# Patient Record
Sex: FEMALE | Race: WHITE | HISPANIC OR LATINO | Employment: OTHER | URBAN - METROPOLITAN AREA
[De-identification: names, ages, dates, MRNs, and addresses within clinical notes are randomized per-mention and may not be internally consistent; named-entity substitution may affect disease eponyms.]

---
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
## 2021-04-20 DIAGNOSIS — E06.3 HYPOTHYROIDISM DUE TO HASHIMOTO'S THYROIDITIS: ICD-10-CM

## 2021-04-20 RX ORDER — LEVOTHYROXINE SODIUM 100 UG/1
100 TABLET ORAL DAILY
Qty: 30 TABLET | Refills: 0 | Status: SHIPPED | OUTPATIENT
Start: 2021-04-20 | End: 2021-04-26

## 2021-04-26 ENCOUNTER — OFFICE VISIT (OUTPATIENT)
Dept: PEDIATRICS | Facility: CLINIC | Age: 21
End: 2021-04-26
Attending: PEDIATRICS
Payer: COMMERCIAL

## 2021-04-26 ENCOUNTER — HOSPITAL ENCOUNTER (OUTPATIENT)
Dept: LAB | Facility: CLINIC | Age: 21
End: 2021-04-26
Attending: PEDIATRICS
Payer: COMMERCIAL

## 2021-04-26 VITALS — WEIGHT: 158.07 LBS | HEIGHT: 64 IN | BODY MASS INDEX: 26.99 KG/M2

## 2021-04-26 DIAGNOSIS — E66.3 OVERWEIGHT: ICD-10-CM

## 2021-04-26 DIAGNOSIS — E06.3 HYPOTHYROIDISM DUE TO HASHIMOTO'S THYROIDITIS: Primary | ICD-10-CM

## 2021-04-26 DIAGNOSIS — E06.3 HYPOTHYROIDISM DUE TO HASHIMOTO'S THYROIDITIS: ICD-10-CM

## 2021-04-26 LAB
T4 FREE SERPL-MCNC: 1.17 NG/DL (ref 0.76–1.46)
TSH SERPL DL<=0.005 MIU/L-ACNC: 4.62 MU/L (ref 0.4–4)

## 2021-04-26 PROCEDURE — 84443 ASSAY THYROID STIM HORMONE: CPT | Performed by: PEDIATRICS

## 2021-04-26 PROCEDURE — 99214 OFFICE O/P EST MOD 30 MIN: CPT | Performed by: PEDIATRICS

## 2021-04-26 PROCEDURE — 36415 COLL VENOUS BLD VENIPUNCTURE: CPT | Performed by: PEDIATRICS

## 2021-04-26 PROCEDURE — G0463 HOSPITAL OUTPT CLINIC VISIT: HCPCS

## 2021-04-26 PROCEDURE — 84439 ASSAY OF FREE THYROXINE: CPT | Performed by: PEDIATRICS

## 2021-04-26 RX ORDER — LEVOTHYROXINE SODIUM 112 UG/1
112 TABLET ORAL DAILY
Qty: 90 TABLET | Refills: 3 | Status: SHIPPED | OUTPATIENT
Start: 2021-04-26 | End: 2021-11-12 | Stop reason: DRUGHIGH

## 2021-04-26 RX ORDER — CETIRIZINE HYDROCHLORIDE 5 MG/1
5 TABLET ORAL DAILY
COMMUNITY

## 2021-04-26 RX ORDER — LEVOTHYROXINE SODIUM 100 UG/1
100 TABLET ORAL DAILY
Qty: 90 TABLET | Refills: 3 | Status: SHIPPED | OUTPATIENT
Start: 2021-04-26 | End: 2021-04-26 | Stop reason: DRUGHIGH

## 2021-04-26 ASSESSMENT — PAIN SCALES - GENERAL: PAINLEVEL: NO PAIN (0)

## 2021-04-26 ASSESSMENT — PATIENT HEALTH QUESTIONNAIRE - PHQ9: SUM OF ALL RESPONSES TO PHQ QUESTIONS 1-9: 5

## 2021-04-26 ASSESSMENT — MIFFLIN-ST. JEOR: SCORE: 1479.13

## 2021-04-26 NOTE — NURSING NOTE
"Informant-    Halie is accompanied by self    Reason for Visit-  Hasimotos    Vitals signs-  Ht 1.629 m (5' 4.13\")   Wt 71.7 kg (158 lb 1.1 oz)   BMI 27.02 kg/m      There are concerns about the child's exposure to violence in the home: No    Face to Face time: 5 minutes  Jeannie Chamorro MA        "

## 2021-04-26 NOTE — PATIENT INSTRUCTIONS
1- Please increase the dose of levothyroxine from 100 to 112 mcg orally daily. I placed a new prescription in today (90-day supply ar your request to [facility]).  2- You will need recheck thyroid labs in 1 month.   3- I am referring you to a registered dietitian.   4- Follow up with me in a year.    For questions, please call [phone]

## 2021-04-26 NOTE — PROGRESS NOTES
Pediatric Endocrinology Follow-Up Consultation      Patient: Halie Vigil MRN# [id number]   YOB: 2000 Age: 20 year old   Date of Visit: Apr 26, 2021      Dear Dr. Saskia Bennett:    I had the pleasure of seeing your patient, Halie Vigil in the Pediatric Endocrinology Clinic, [facility], on Apr 26, 2021 for a follow up consultation regarding hypothyroidism due to Hashimoto's thyroiditis.           Problem list:     Patient Active Problem List    Diagnosis Date Noted     Hypothyroidism due to Hashimoto's thyroiditis 11/02/2018     Priority: Medium     Hashimoto's thyroiditis 11/02/2018     Priority: Medium     Abnormal finding on thyroid function test 10/29/2018     Priority: Medium            HPI:   Initial history was obtained from patient, patient's mother and paper chart from her PCPs office.  As you well know, Halie Vigil is a 20 year old female with hypothyroidism in the context of Hashimoto's thyroiditis, and food/seasonal/environmental allergies whom I had the pleasure of evaluating for the first time on 10/30/2018 when she presented with her mother as a referral from her PCP's office in consultation for hypothyroidism in the context of auto-immune thyroid disease.    She was being evaluated by her PCP for painful periods. On 8/20/2018, her TSH was 12.52 mU/mL (reference range 0.4-4.5), with a free T4 of 1.05 ng/dL (reference range 0.9-1.6). On 9/4/2018, her TSH was 15.58 mU/mL (reference range 0.4-4.5), with a free T4 of 1 ng/dL, and positive thyroid peroxidase (TPO) and anti-thyroglobulin antibodies (369 international unit(s)/mL and 13.3 international unit(s)/mL, respectively). She was started on levothyroxine 50 mcg orally daily at that point. On 10/20/2018, her TSH was 2.95 mU/mL, with a free T4 of 1.68 ng/dL (reference range 0.9-1.6), so her PCP spoke to someone from endocrine and her levothyroxine dose was reduced from 50 to 44 mcg orally  daily (half the 88 mcg pills). Her repeat labs on 11/19/2018 showed normal TSH and free T4 levels.    Of note, Halie was on Thyrostim by [employer] (a homeopathic supplement) during the time frame from Sept- Oct 2018. It was discontinued a week before her labs that were drawn on 10/20/2018.  She was screened for celiac disease on 1/26/2018, by her PCP and it was negative.      Interim History:  Halie is not accompanied to today's visit by anyone.  Since her last visit on 5/7/2020, she had been doing well although she states that she and her entire family got COVID-19 in October 2020.     Halie has a normal level of energy, and a normal appetite. She had a 15 Ib weight gain over the last 2 years. She states that this is unexpected as she has continued to work out during the pandemic. She has normal bowel movements. Halie denies having palpitations, tremor, sleep disturbance, heat or cold intolerance or skin/hair changes.  Halie is currently on 100 mcg of levothyroxine orally daily, and has been taking it regularly every morning at 8 AM, 1 hour before eating, the same way every day. There have not been issues with remembering her medication.    Halie is currently on 100 mcg of levothyroxine orally daily (this dose was last changed on 5/7/2020), and has been taking it regularly every morning at 8 AM, the same way every day (1 hour before eating). There have not been issues with remembering to take her medication.    Her last set of thyroid labs was on 7/29/2020 which showed an elevated TSH of 3.6 uIU/mL (ref range 0.4-4) and a free T4 of 1.45 ng/dL (ref range 0.93-1.6).     Halie is generally healthy, does not get sick frequently, and when she does, she recovers quickly. She denies having polyuria or polydipsia.   She had menarche at age 12 years.  Her periods occur monthly and last 5-7 days. Her LMP was 4/21/2021. She has been on OCP's which were started by her PCP.    I have reviewed the available past  laboratory evaluations, imaging studies, and medical records available to me at this virtual visit. I have reviewed Halie's growth chart.            Social History:     Social History     Social History Narrative    5/5/2019: Halie lives with her mother and brother in [place], [place]. 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 Her parents and  and is alive and well.     She has a number of allergies to foods (anaphylaxis with nuts, mouth itching with fruits and uncooked veggies).        5/7/2020: Halie lives with her mother in [place], [place]. She's a senior in [employer] and is currently doing distant learning due to the COVID-19 pandemic. She has a number of food allergies.          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  Lives at home with her family. She works 5 days per week at [employer] and at [employer].          Family History:   Father is  5 feet 11 inches tall.  Mother is  5 feet 4.5 inches tall.     Midparental Height is 5 feet 5.25 inches.      History of:  Adrenal insufficiency: none.  Autoimmune disease: none (no SLE, Crohn's, ulcerative colitis, celiac disease, .  Calcium problems: none.  Delayed puberty: none.  Diabetes mellitus: maternal great grandparents, and paternal great grandfather (T2D).  Early puberty: none.  Genetic disease: none.  Short stature: none.  Tall stature: none.  Thyroid disease: maternal second cousin (male), the father of whom has thyroid disease and is unrelated to Halie's mother.  Seasonal allergies: father and brother   MI: her father had a heart attack at about 56 years of age.  SVT: mother (status post ablation in Dec 2019)    Reviewed. The entire immediate family got COVID-19 in October 2020. They recovered. Her brother (21 years) had an appendectomy.          Allergies:     Allergies   Allergen Reactions     Amoxicillin      Penicillins              Medications:     Current Outpatient Medications  "  Medication Sig Dispense Refill     cetirizine (ZYRTEC) 5 MG tablet Take 5 mg by mouth daily       levothyroxine (SYNTHROID/LEVOTHROID) 112 MCG tablet Take 1 tablet (112 mcg) by mouth daily 90 tablet 3     norgestim-eth estrad triphasic (ORTHO TRI-CYCLEN, 28,) 0.18/0.215/0.25 MG-35 MCG tablet Take 1 tablet by mouth daily                Review of Systems:   Gen: Negative.  Eye: Negative.  ENT: she states that her sense of smell has not returned after she got COVID.  Pulmonary:  Negative.  Cardio: Negative.  Gastrointestinal: Negative.   Hematologic: Negative.  Genitourinary: Negative.  Musculoskeletal: Negative.  Psychiatric: Anxiety was high during the school year, but since the stay-at-home order and distant learning that started due to the COVID-19 pandemic she has had significantly less anxiety. She is seeing a therapist every other week.   Neurologic: she reports loss of olfaction since she got COVID-19.  Skin: hirsutism (hair on the upper lip, chin and below the umbilicus which she shaves). Her mother previously stated that she has the same issue but continues to have normal periods and attributes this to her [place] heritage. Of note, she was started on OCP's about 2 years ago.     Endocrine: see HPI.            Physical Exam:   Height 1.629 m (5' 4.13\"), weight 71.7 kg (158 lb 1.1 oz), not currently breastfeeding.  Blood pressure percentiles are not available for patients who are 18 years or older.  Height: 5' 4.134\", 48 %ile (Z= -0.06) based on CDC (Girls, 2-20 Years) Stature-for-age data based on Stature recorded on 4/26/2021.  Weight: 158 lbs 1.12 oz, 87 %ile (Z= 1.11) based on CDC (Girls, 2-20 Years) weight-for-age data using vitals from 4/26/2021.  BMI: Body mass index is 27.02 kg/m . 88 %ile (Z= 1.16) based on CDC (Girls, 2-20 Years) BMI-for-age based on BMI available as of 4/26/2021.      Constitutional: awake, alert, cooperative, no apparent distress  Eyes:   Lids and lashes normal, sclera clear, " conjunctiva normal. Pupils are equal, round and reactive to light.  ENT:    Normocephalic, without obvious abnormality, external ears without lesions, oral pharynx with moist mucus membranes  Neck:   Supple, symmetrical, trachea midline, thyroid symmetric, palpable, not enlarged and no tenderness  Hematologic / Lymphatic:       no cervical lymphadenopathy  Lungs: No increased work of breathing, clear to auscultation bilaterally with good air entry.  Cardiovascular:           Regular rate and rhythm, no murmurs.  Abdomen:        No scars, normal bowel sounds, soft, non-distended, non-tender, no masses palpated, no hepatosplenomegaly  Breasts: deferred  Pubic hair: deferred  Musculoskeletal: There is no redness, warmth, or swelling of the joints.  Full range of motion noted.  Motor strength and tone are normal.  Neurologic:      no hand tremor. Awake, alert, oriented to name, place and time.  Patellar deep tendon reflexes are symmetric and 2+.  Neuropsychiatric:        normal  Skin:    She has mild acne (a few small acne lesions on the face). She has shaved residual dark hairs on the upper lip, chin, and male pattern hair growth below the umbilicus. No acanthosis nigricans.        Laboratory results:     Component      Latest Ref Rng & Units 8/20/2018 9/4/2018 10/20/2018   TSH     0.4-4.5 mcU/mL 12.52 15.58 2.950- PCP changed levothyroxine dose from 50 to 44 mcg orally daily   T4 Free     0.9-1.6 ng/dL 1.05 1 1.68     At her PCP's office on 9/4/2018:    international unit(s)/mL (reference range 0-26)  Anti-thyroglobulin antibodies 13.3 international units/mL ( reference range 0-0.9)     Component      Latest Ref Rng & Units 4/4/2019- on 44 mcg of levothyroxine 10/24/2019- on 50 mcg levothyroxine 1/24/2020- on 62.5 mcg levothyroxine 4/29/2020- on 75 mcg of levothyroxine   TSH      0.40 - 4.00 mU/L 6.47 (H) 5.56 (H) 4.95 (H) 12.26 (H)   T4 Free      0.76 - 1.46 ng/dL 0.96 1.31 1.06 1     Component      Latest Ref  Rng & Units 4/26/2021- on 100 mcg of levothyroxine daily   T4 Free      0.76 - 1.46 ng/dL 1.17   TSH      0.40 - 4.00 mU/L 4.62 (H)            Assessment and Plan:   1- Hashimoto's thyroiditis   2- Auto-immune hypothyroidism (due to Hashimoto's thyroiditis)  3- Rapid weight gain    Halie is a 20-year-old female with hypothyroidism in the context of Hashimoto's thyroiditis. She was started on levothyroxine in Sept 2018. Her most recent thyroid function tests today 4/26/2021 indicated that her dose -assuming good and regular intake- is inadequate. I recommend increasing her levothyroxine dose from 100 to 112 mcg orally taken daily, and rechecking her labs in 1-2 months.      Discussed symptoms of hypothyroidism, what Hashimoto's thyroiditis is, its diagnosis and natural history.     Finally, Halie has gained 15 Ib since 2019. This is despite working out at the gym daily even through the COVID-19 pandemic. I advised that she meet with the registered dietitian.    Plan:     Orders Placed This Encounter   Procedures     TSH     T4 free       Patient Instructions   1- Please increase the dose of levothyroxine from 100 to 112 mcg orally daily. I placed a new prescription in today (90-day supply ar your request to [facility]).  2- You will need recheck thyroid labs in 1 month.   3- I am referring you to a registered dietitian.   4- Follow up with me in a year.    For questions, please call [phone]  The plan had been discussed in detail with Halie and her mother who are in agreement.  Thank you for allowing me the opportunity to participate in Halie's care.  Please do not hesitate to call with questions or concerns.    Review of the result(s) of each unique test - TSH, free T4  30 minutes spent on the date of the encounter doing chart review, history and exam, documentation and further activities per the note      Sincerely,    Brianna Montalvo, MS  , Pediatric Endocrinology  [facility]   [facility]   Tel. [phone]  Fax [phone]    CC  VENICE ORTEGA    Copy to patient  DIANA, J CARLOS TAO  [address] [facility] [id number]

## 2021-04-27 ENCOUNTER — TELEPHONE (OUTPATIENT)
Dept: PEDIATRICS | Facility: CLINIC | Age: 21
End: 2021-04-27

## 2021-04-27 DIAGNOSIS — E06.3 HYPOTHYROIDISM DUE TO HASHIMOTO'S THYROIDITIS: Primary | ICD-10-CM

## 2021-04-27 NOTE — TELEPHONE ENCOUNTER
----- Message from Chelo Antony MD sent at 4/26/2021  5:15 PM CDT -----  Hi Ladies,    Can you please call the patient herself and let her know that her TSH is mildly elevated which suggests that her dose of levothyroxine may not be enough.    1- Please increase the dose of levothyroxine from 100 to 112 mcg orally daily. I placed a new prescription in today (90-day supply ar your request to [facility]).  2- You will need recheck thyroid labs in 1 month.     Please let the pharmacy know that I cancelled the previous prescription that I put in today for the 100 mcg dose.    Thank you so much.    Chelo

## 2021-04-27 NOTE — CONFIDENTIAL NOTE
Spoke to Halie regarding update and recommendation from Dr Antony, verbalized understanding.   Will place future thyroid orders for lab drawn in one month.    Saskia Araujo RN on 4/27/2021 at 2:44 PM

## 2021-05-25 ENCOUNTER — VIRTUAL VISIT (OUTPATIENT)
Dept: PEDIATRICS | Facility: CLINIC | Age: 21
End: 2021-05-25
Attending: PEDIATRICS
Payer: COMMERCIAL

## 2021-05-25 DIAGNOSIS — E66.3 OVERWEIGHT: Primary | ICD-10-CM

## 2021-05-25 PROCEDURE — 97802 MEDICAL NUTRITION INDIV IN: CPT | Mod: GT | Performed by: DIETITIAN, REGISTERED

## 2021-05-25 NOTE — PROGRESS NOTES
CLINICAL NUTRITION SERVICES - PEDIATRIC VIDEO VISIT NOTE    Halie is a 20 year old who is being evaluated via a billable video visit.      If the video visit is dropped, the invitation should be resent by: Send to e-mail at: [email or web address]  Will anyone else be joining your video visit? No    Video Start Time: 10:44 AM    Video-Visit Details    Type of service:  Video Visit    Video End Time:11:16 AM    Originating Location (pt. Location): Home    Distant Location (provider location):  [facility] [place]     Platform used for Video Visit: [facility]     CLINICAL NUTRITION SERVICES - PEDIATRIC ASSESSMENT NOTE    REASON FOR ASSESSMENT  Halie Vigil is a 20 year old female seen by the dietitian via video visit for education on healthy eating.     ANTHROPOMETRICS  Height/Length : 162.9 cm, 47.58%tile (Z-score: -0.06)  Weight: 71.7 kg, 86.64%tile (Z-score: 1.11)  BMI: 27.02 kg/m^2, 87.6%tile (Z-score: 1.16)  Dosing Weight: 71.7 kg  Comments: Height stable at the 48%tile.  Weight gain of 9.6 kg (21 lb) from July 2019 to present.  Increased from the 72%tile to the 86%tile.      NUTRITION HISTORY & CURRENT NUTRITIONAL INTAKES  Halie is on a reguar diet at home.   Typical food/fluid intake is:  -breakfast: banana + Que bar (before going to the gym)  -lunch: 2 eggs (fried or hardboiled) + toast + fruit + yogurt (Chobani)/cheese  -dinner: chicken/shrimp + vegetable (peppers) + potatoes (small portion) or pasta + veggie meatballs or at work at [employer] has an [employer] breakfast sandwich (vegan sandwich containing 420 Kcal, 22 gm fat)  -beverages: water, Ripple milk (Original) with lunch and dinner, cranberry digestive health juice box, occasional drinks from [employer] (black tea + lemonade, Refresher drink = 90 Kcal, latte with sugar-free vanilla and oat milk)  Halie states she does track her intake sometimes and typically tries to stay around 1800 Kcal on days she does not  work out and 2000 Kcal on days she does work out.   Halie is very active and goes to the gym 5-6 days per week.  She typically bikes, runs, strength trains and does power lifting and ab workouts.  Halie states she has been doing a lot more strength training over the winter and now that she is not in track and field (graduated high school), she has not been running as much.  However, is starting to increase her cardio now that it's nicer outside. She states that even with the weight gain she has had, her clothes still fit and she doesn't see drastic differences in her body.    Information obtained from Patient  Factors affecting nutrition intake include: none    CURRENT NUTRITION SUPPORT  No current nutrition support    PHYSICAL FINDINGS  Observed  Appears fit   Obtained from Chart/Interdisciplinary Team  History of hypothyroidism due to Hashimoto's thyroiditis    LABS Reviewed    MEDICATIONS Reviewed    ASSESSED NUTRITION NEEDS  BMR (5247) x 1.2-1.4 = 1712-5102 Kcal/d  Estimated Energy Needs: 26-30 kcal/kg  Estimated Protein Needs: 0.8 g/kg  Estimated Fluid Needs: 2535 mL (maintenance) or per MD  Micronutrient Needs: RDA for age    NUTRITION STATUS VALIDATION  Patient does not meet criteria for malnutrition at this time.    NUTRITION DIAGNOSIS  Food and nutrition-related knowledge deficit related to above average weight gain over the past 1.5 years as evidenced by no previous formal education on healthy eating.    INTERVENTIONS  Nutrition Prescription  Make healthy choices to meet 100% of assessed nutrition needs and maintain appropriate BMI.     Nutrition Education  Provided education on healthy eating and calorie goals.  Discussed that overall Halie appears to be eating a healthy, balanced diet and does not appear to be overeating.  Discussed that weight gain may be largely due to the amount of strength training Halie has been doing with less cardio work outs.  Especially given Halie's clothes still fit her  the same and she does not feel like she gained a significant amount of weight.  Discussed strategies for healthy eating and weight management such as emphasis on fruits and vegetables as at least half of meals with 1/4 being starch/grain and 1/4 meat/protein.  Also suggested being mindful of calorie containing beverages she is consuming and potentially choosing a lower calorie (and lower fat) option for dinner when she is working.  Suggested increasing cardio workouts as well to see if this helps with weight maintenance or slight loss.  Halie receptive to information discussed.    Implementation  1. Provided education on healthy eating.  See above for details.  2. Provided with RD contact information and encouraged follow-up as needed.    Goals   1. Meet 100% of assessed nutrition needs.   2. Weight maintenance or slight loss to maintain/decrease BMI.     FOLLOW UP/MONITORING  Will continue to monitor progress towards goals and provide nutrition education as needed.    Cara Dixon RD, LD   Pediatric Dietitian   Email: [email or web address]   Phone: [phone]   Fax #: [phone]

## 2021-05-25 NOTE — NURSING NOTE
Halie is a 20 year old who is being evaluated via a billable video visit.      How would you like to obtain your AVS? Mail a copy  If the video visit is dropped, the invitation should be resent by: Send to e-mail at: [email or web address]  Will anyone else be joining your video visit? No      Video Start Time:   Video-Visit Details    Type of service:  Video Visit    Video End Time:    Originating Location (pt. Location): Home    Distant Location (provider location):  [facility] [place]     Platform used for Video Visit: Ren Araujo RN on 5/25/2021 at 10:35 AM

## 2021-08-07 LAB
T4 FREE SERPL-MCNC: 1.38 NG/DL
TSH SERPL-ACNC: 5.6 MCU/ML

## 2021-11-08 ENCOUNTER — LAB (OUTPATIENT)
Dept: LAB | Facility: CLINIC | Age: 21
End: 2021-11-08
Payer: COMMERCIAL

## 2021-11-08 DIAGNOSIS — E06.3 HYPOTHYROIDISM DUE TO HASHIMOTO'S THYROIDITIS: ICD-10-CM

## 2021-11-08 LAB
T4 FREE SERPL-MCNC: 1.08 NG/DL (ref 0.76–1.46)
TSH SERPL DL<=0.005 MIU/L-ACNC: 4.33 MU/L (ref 0.4–4)

## 2021-11-08 PROCEDURE — 36415 COLL VENOUS BLD VENIPUNCTURE: CPT

## 2021-11-08 PROCEDURE — 84443 ASSAY THYROID STIM HORMONE: CPT

## 2021-11-08 PROCEDURE — 84439 ASSAY OF FREE THYROXINE: CPT

## 2021-11-10 ENCOUNTER — DOCUMENTATION ONLY (OUTPATIENT)
Dept: OTHER | Facility: CLINIC | Age: 21
End: 2021-11-10
Payer: COMMERCIAL

## 2021-11-12 ENCOUNTER — TELEPHONE (OUTPATIENT)
Dept: PEDIATRICS | Facility: CLINIC | Age: 21
End: 2021-11-12
Payer: COMMERCIAL

## 2021-11-12 DIAGNOSIS — E06.3 HYPOTHYROIDISM DUE TO HASHIMOTO'S THYROIDITIS: Primary | ICD-10-CM

## 2021-11-12 RX ORDER — LEVOTHYROXINE SODIUM 125 UG/1
125 TABLET ORAL DAILY
Qty: 30 TABLET | Refills: 3 | Status: SHIPPED | OUTPATIENT
Start: 2021-11-12 | End: 2022-03-23

## 2021-11-15 NOTE — TELEPHONE ENCOUNTER
Mom called back and was given note from Dr. Antony. She will plan on having labs done again in about 4-6 weeks.

## 2021-11-15 NOTE — TELEPHONE ENCOUNTER
Called and left message for family to call back for results from Dr. Antony.        Can you please let her know that the TSH is mildly elevated with a normal free T4.     Component                                    Latest Ref Rng & Units                   11/8/2021               T4 Free                                      0.76 - 1.46 ng/dL                        1.08                     TSH                                          0.40 - 4.00 mU/L                         4.33 (H)                     I recommend increasing her levothyroxine dose from 112 mcg daily to 125 mcg daily and repeating labs in 6 weeks.

## 2021-12-22 DIAGNOSIS — N92.6 IRREGULAR PERIODS: Primary | ICD-10-CM

## 2021-12-30 ENCOUNTER — LAB (OUTPATIENT)
Dept: LAB | Facility: CLINIC | Age: 21
End: 2021-12-30
Payer: COMMERCIAL

## 2021-12-30 DIAGNOSIS — N92.6 IRREGULAR PERIODS: ICD-10-CM

## 2021-12-30 DIAGNOSIS — E06.3 HYPOTHYROIDISM DUE TO HASHIMOTO'S THYROIDITIS: ICD-10-CM

## 2021-12-30 LAB
ESTRADIOL SERPL-MCNC: 61 PG/ML
FSH SERPL-ACNC: 6.2 IU/L
LH SERPL-ACNC: 11 IU/L
T4 FREE SERPL-MCNC: 1.19 NG/DL (ref 0.76–1.46)
TSH SERPL DL<=0.005 MIU/L-ACNC: 1.25 MU/L (ref 0.4–4)

## 2021-12-30 PROCEDURE — 82670 ASSAY OF TOTAL ESTRADIOL: CPT

## 2021-12-30 PROCEDURE — 83002 ASSAY OF GONADOTROPIN (LH): CPT

## 2021-12-30 PROCEDURE — 36415 COLL VENOUS BLD VENIPUNCTURE: CPT

## 2021-12-30 PROCEDURE — 84439 ASSAY OF FREE THYROXINE: CPT

## 2021-12-30 PROCEDURE — 84443 ASSAY THYROID STIM HORMONE: CPT

## 2021-12-30 PROCEDURE — 83001 ASSAY OF GONADOTROPIN (FSH): CPT

## 2022-01-06 ENCOUNTER — TELEPHONE (OUTPATIENT)
Dept: PEDIATRICS | Facility: CLINIC | Age: 22
End: 2022-01-06
Payer: COMMERCIAL

## 2022-01-06 NOTE — TELEPHONE ENCOUNTER
Called and left mom message with results info on letter from Dr. Antony. Normal labs, continue on same dose of levothyroxine and follow up in 1 year w/ labs.

## 2022-03-23 DIAGNOSIS — E06.3 HYPOTHYROIDISM DUE TO HASHIMOTO'S THYROIDITIS: ICD-10-CM

## 2022-03-23 RX ORDER — LEVOTHYROXINE SODIUM 125 UG/1
125 TABLET ORAL DAILY
Qty: 30 TABLET | Refills: 3 | Status: SHIPPED | OUTPATIENT
Start: 2022-03-23 | End: 2022-07-17

## 2022-03-23 NOTE — TELEPHONE ENCOUNTER
Refill request received from: [facility]  Medication Requested: Levothyroxine 125 mcg  Directions: One tablet by mouth daily  Quantity: 30 with 3 month supply  Last Office Visit: 4/26/21  Next Appointment Scheduled for: none, per note f/u in 1 year with labs  Last refill: 11/12/21  Sent To:  RN or Provider       Mom states that patient will be going out of town and has 2 pills left. Mom also requesting to have more refills on hand.    Will route to provider for review.    Anne Gutiérrez on 3/23/2022 at 8:29 AM

## 2022-05-21 ENCOUNTER — HOSPITAL ENCOUNTER (EMERGENCY)
Facility: CLINIC | Age: 22
Discharge: HOME | End: 2022-05-21
Attending: EMERGENCY MEDICINE | Admitting: EMERGENCY MEDICINE
Payer: COMMERCIAL

## 2022-05-21 VITALS
WEIGHT: 158 LBS | BODY MASS INDEX: 27.01 KG/M2 | HEART RATE: 84 BPM | RESPIRATION RATE: 18 BRPM | DIASTOLIC BLOOD PRESSURE: 81 MMHG | SYSTOLIC BLOOD PRESSURE: 133 MMHG | OXYGEN SATURATION: 99 % | TEMPERATURE: 97.6 F

## 2022-05-21 DIAGNOSIS — L03.115 CELLULITIS OF RIGHT FOOT: ICD-10-CM

## 2022-05-21 PROCEDURE — 250N000013 HC RX MED GY IP 250 OP 250 PS 637: Performed by: EMERGENCY MEDICINE

## 2022-05-21 PROCEDURE — 99283 EMERGENCY DEPT VISIT LOW MDM: CPT

## 2022-05-21 RX ORDER — CEPHALEXIN 500 MG/1
500 CAPSULE ORAL ONCE
Status: COMPLETED | OUTPATIENT
Start: 2022-05-21 | End: 2022-05-21

## 2022-05-21 RX ORDER — CEPHALEXIN 500 MG/1
500 CAPSULE ORAL 3 TIMES DAILY
Qty: 20 CAPSULE | Refills: 0 | Status: SHIPPED | OUTPATIENT
Start: 2022-05-21 | End: 2022-05-28

## 2022-05-21 RX ADMIN — CEPHALEXIN 500 MG: 500 CAPSULE ORAL at 23:08

## 2022-05-21 ASSESSMENT — ENCOUNTER SYMPTOMS
FEVER: 0
COLOR CHANGE: 1
MUSCULOSKELETAL NEGATIVE: 1
WOUND: 1

## 2022-05-21 NOTE — LETTER
May 21, 2022      To Whom It May Concern:      Halie Vigil was seen in our Emergency Department today, 05/21/22.  I expect her condition to improve over the next 1-2 days.  She may return to work/school when improved.    Sincerely,        Lisandra RN

## 2022-05-22 NOTE — ED PROVIDER NOTES
History     Chief Complaint:    Foot Pain and Wound Check      HPI   Hlaie Vigil is a 21 year old female who presents with acute spreading redness R foot.  Earlier today, seen by podiatry for planar wart, with topical treatment performed.  No fever; no hx diabetes or MRSA.     Review of Systems   Constitutional: Negative for fever.   Musculoskeletal: Negative.    Skin: Positive for color change and wound.   All other systems reviewed and are negative.    Allergies:  Amoxicillin  Pcn [Penicillins]      Medications:    cephALEXin (KEFLEX) 500 MG capsule  cetirizine (ZYRTEC) 5 MG tablet  levothyroxine (SYNTHROID/LEVOTHROID) 125 MCG tablet  norgestim-eth estrad triphasic (ORTHO TRI-CYCLEN, 28,) 0.18/0.215/0.25 MG-35 MCG tablet        Past Medical History:    No past medical history on file.  Patient Active Problem List    Diagnosis Date Noted     Hypothyroidism due to Hashimoto's thyroiditis 11/02/2018     Priority: Medium     Hashimoto's thyroiditis 11/02/2018     Priority: Medium     Abnormal finding on thyroid function test 10/29/2018     Priority: Medium      Social History:  Saskia Bennett  The patient presents to the ED with mother    Physical Exam     Patient Vitals for the past 24 hrs:   BP Temp Temp src Pulse Resp SpO2 Weight   05/21/22 2243 (!) 141/90 97.6  F (36.4  C) Temporal 71 18 99 % 71.7 kg (158 lb)       Physical Exam  General: Patient is alert and cooperative.  Eyes: EOMI. Normal conjunctiva.  Neck:  Normal range of motion and appearance.   Cardiovascular:  Normal rate.   Pulmonary/Chest:  Effort normal.    Musculoskeletal: R foot notable for plantar wart plantar aspect first MTP. See skin exam below.   Neurological: oriented, normal strength, sensation, and coordination.   Skin: erythematous changes surrounding plantar wart, with streak extending to dorsal aspect of foot/anterior ankle.  Psychiatric: Normal mood and affect. Normal behavior and judgement.                Emergency Department  Course   Emergency Department Course:    Assessments:   I obtained history and examined the patient as noted above.    I rechecked the patient and explained findings.     Interventions:  Medications   cephALEXin (KEFLEX) capsule 500 mg (500 mg Oral Given 5/21/22 2308)       Disposition:  The patient was discharged to home.     Impression & Plan    Medical Decision Making:  Afebrile 21 year old female with acute right foot cellulitis, related to recent plantar wart treatment.  Pic noted above.  Treated with keflex, plan 1 wk course, wound check this week if not resolving, return if worse.     Diagnosis:    ICD-10-CM    1. Cellulitis of right foot  L03.115        Discharge Medications:  New Prescriptions    CEPHALEXIN (KEFLEX) 500 MG CAPSULE    Take 1 capsule (500 mg) by mouth 3 times daily for 7 days       .      Camron Alcala MD  05/22/22 6530

## 2022-06-14 DIAGNOSIS — E06.3 HYPOTHYROIDISM DUE TO HASHIMOTO'S THYROIDITIS: Primary | ICD-10-CM

## 2022-07-11 ENCOUNTER — LAB (OUTPATIENT)
Dept: LAB | Facility: CLINIC | Age: 22
End: 2022-07-11
Payer: COMMERCIAL

## 2022-07-11 DIAGNOSIS — E06.3 HYPOTHYROIDISM DUE TO HASHIMOTO'S THYROIDITIS: ICD-10-CM

## 2022-07-11 LAB
T4 FREE SERPL-MCNC: 1.26 NG/DL (ref 0.76–1.46)
TSH SERPL DL<=0.005 MIU/L-ACNC: 1.34 MU/L (ref 0.4–4)

## 2022-07-11 PROCEDURE — 84443 ASSAY THYROID STIM HORMONE: CPT

## 2022-07-11 PROCEDURE — 84439 ASSAY OF FREE THYROXINE: CPT

## 2022-07-11 PROCEDURE — 36415 COLL VENOUS BLD VENIPUNCTURE: CPT

## 2022-07-14 DIAGNOSIS — E06.3 HYPOTHYROIDISM DUE TO HASHIMOTO'S THYROIDITIS: ICD-10-CM

## 2022-07-14 NOTE — TELEPHONE ENCOUNTER
Mom called to request med refill.  Patient leaves for college next week.    Refill request received from: Mom  Pharmacy: [facility]  Medication Requested: Levothyroxine 125mcg  Directions: Take 1 tablet (125 mcg) by mouth daily  Quantity: 30  Last Office Visit: 4/26/21  Next Appointment Scheduled for: 10/6/22  Last refill: 6/22/22  Sent To:  RN or Provider      Routed to provider to sign.  Anne Gutiérrez RN on 7/14/2022 at 1:14 PM

## 2022-07-17 RX ORDER — LEVOTHYROXINE SODIUM 125 UG/1
125 TABLET ORAL DAILY
Qty: 30 TABLET | Refills: 3 | Status: SHIPPED | OUTPATIENT
Start: 2022-07-17 | End: 2022-11-06

## 2022-08-17 DIAGNOSIS — N92.6 IRREGULAR PERIODS: Primary | ICD-10-CM

## 2022-08-17 NOTE — PROGRESS NOTES
Patient's mother called in to inquire about having labs drawn for patient. Patient was seen by SYD Dai recently and provider advised patient to have labs drawn as patient continues to have irregular periods.   She is not on any OCPs.   Orders placed. Patient will be home on Monday for lab draw.     Saskia Araujo RN on 8/17/2022 at 2:50 PM

## 2022-08-22 ENCOUNTER — LAB (OUTPATIENT)
Dept: LAB | Facility: CLINIC | Age: 22
End: 2022-08-22
Payer: COMMERCIAL

## 2022-08-22 DIAGNOSIS — N92.6 IRREGULAR PERIODS: ICD-10-CM

## 2022-08-22 LAB
ESTRADIOL SERPL-MCNC: 39 PG/ML
FSH SERPL IRP2-ACNC: 6.5 MIU/ML
LH SERPL-ACNC: 12.6 MIU/ML

## 2022-08-22 PROCEDURE — 83002 ASSAY OF GONADOTROPIN (LH): CPT

## 2022-08-22 PROCEDURE — 82670 ASSAY OF TOTAL ESTRADIOL: CPT

## 2022-08-22 PROCEDURE — 83001 ASSAY OF GONADOTROPIN (FSH): CPT

## 2022-08-22 PROCEDURE — 36415 COLL VENOUS BLD VENIPUNCTURE: CPT

## 2022-11-20 ENCOUNTER — HEALTH MAINTENANCE LETTER (OUTPATIENT)
Age: 22
End: 2022-11-20

## 2022-12-09 ENCOUNTER — VIRTUAL VISIT (OUTPATIENT)
Dept: PEDIATRICS | Facility: CLINIC | Age: 22
End: 2022-12-09
Attending: PEDIATRICS
Payer: COMMERCIAL

## 2022-12-09 VITALS — HEIGHT: 65 IN | WEIGHT: 155 LBS | BODY MASS INDEX: 25.83 KG/M2

## 2022-12-09 DIAGNOSIS — Z79.899 ENCOUNTER FOR LONG-TERM CURRENT USE OF MEDICATION: ICD-10-CM

## 2022-12-09 DIAGNOSIS — E06.3 HYPOTHYROIDISM DUE TO HASHIMOTO'S THYROIDITIS: Primary | ICD-10-CM

## 2022-12-09 DIAGNOSIS — E66.3 OVERWEIGHT: ICD-10-CM

## 2022-12-09 DIAGNOSIS — N92.6 IRREGULAR PERIODS: ICD-10-CM

## 2022-12-09 PROCEDURE — G0463 HOSPITAL OUTPT CLINIC VISIT: HCPCS | Mod: PN,GT | Performed by: PEDIATRICS

## 2022-12-09 PROCEDURE — 99213 OFFICE O/P EST LOW 20 MIN: CPT | Mod: GT | Performed by: PEDIATRICS

## 2022-12-09 ASSESSMENT — PAIN SCALES - GENERAL: PAINLEVEL: NO PAIN (0)

## 2022-12-09 NOTE — NURSING NOTE
Halie Vigil  is being evaluated via a billable video visit.      How would you like to obtain your AVS? [employer]  For the video visit, send the invitation by: Text to cell phone: [phone]  Will anyone else be joining your video visit? No

## 2022-12-09 NOTE — PROGRESS NOTES
Video-Visit Details    Type of service:  Video Visit    Video Start Time (time video started): 1410    Video End Time (time video stopped): 1432    Originating Location (pt. Location): Home        Distant Location (provider location):  On-site    Mode of Communication:  Video Conference via [facility]    Physician has received verbal consent for a Video Visit from the patient? Yes    Pediatric Endocrinology Follow-Up Consultation    Patient: Halie Vigil MRN# [id number]   YOB: 2000 Age: 22 year old    Date of Visit: Dec 9, 2022    Dear Dr. Saskia Bennett:    I had the pleasure of seeing your patient, Halie Vigil in the Pediatric Endocrinology Clinic, [facility], on Dec 9, 2022 for a follow up consultation regarding hypothyroidism due to Hashimoto's thyroiditis.           Problem list:     Patient Active Problem List    Diagnosis Date Noted     Hypothyroidism due to Hashimoto's thyroiditis 11/02/2018     Priority: Medium     Hashimoto's thyroiditis 11/02/2018     Priority: Medium     Abnormal finding on thyroid function test 10/29/2018     Priority: Medium          HPI:   Initial history was obtained from patient, and electronic medical record.    Clinical summary:    Halie is a now 22 year old female with a past medical history significant for hypothyroidism in due to Hashimoto's thyroiditis, and food/seasonal/environmental allergies whom Dr. Antony first began to evaluate on 10/30/2018 for hypothyroidism in the context of auto-immune thyroid disease.    She was being evaluated by her PCP for painful periods. On 8/20/2018, her TSH was 12.52 mU/mL (reference range 0.4-4.5), with a free T4 of 1.05 ng/dL (reference range 0.9-1.6). On 9/4/2018, her TSH was 15.58 mU/mL (reference range 0.4-4.5), with a free T4 of 1 ng/dL, and positive thyroid peroxidase (TPO) and anti-thyroglobulin antibodies (369 international unit(s)/mL and 13.3 international unit(s)/mL,  respectively). She was started on levothyroxine 50 mcg orally daily at that point. On 10/20/2018, her TSH was 2.95 mU/mL, with a free T4 of 1.68 ng/dL (reference range 0.9-1.6), so her PCP spoke to someone from endocrine and her levothyroxine dose was reduced from 50 to 44 mcg orally daily (half the 88 mcg pills). Her repeat labs on 11/19/2018 showed normal TSH and free T4 levels.    Of note, Halie was on Thyrostim by [employer] (a homeopathic supplement) during the time frame from Sept- Oct 2018. It was discontinued a week before her labs that were drawn on 10/20/2018. She was screened for celiac disease on 1/26/2018, by her PCP and it was negative.    Interim History (12/9/2022):    Since her last visit on 4/2/26/2021, she had been doing well overall. Halie had some questions regarding previous lab results completed in the summer of 2022 as well as for recommendations for adult providers in the [place], [place] area.     Halie has been on Levothyroxine, 125 mcg PO q day. Compliance with her medication was noted to be good, with no missed doses. She actually had an ED visit where she took by mistake an extra dose of the Levothyoxine and made her have symptoms of thyroid hormone excess.     Halie has a normal level of energy, and a normal appetite. She reports that her weight has remained in the 150-155 pound range with no fluctuations. She has normal bowel movements. Halie denies having palpitations, tremor, sleep disturbance, heat or cold intolerance or skin/hair changes.    Her last set of thyroid labs was on 7/11/2022 which showed an elevated TSH of 1.34 uIU/mL (ref range 0.4-4) and a free T4 of 1.26 ng/dL (ref range 0.93-1.6).     Halie is generally healthy, does not get sick frequently, and when she does, she recovers quickly. She denies having polyuria or polydipsia. She had menarche at age 12 years. In the past Halie has been on OCP's (started by her PCP). Her periods occur every 30-40 days and last 5-7 days.  Halie had labs done on 8/22/2022 that showed a normal estradiol, FSH and LH level.     I have reviewed the available past laboratory evaluations, imaging studies, and medical records available to me at this virtual visit. I have reviewed Halie's growth chart.            Social History:     Social History     Social History Narrative    5/5/2019: Halie lives with her mother and brother in [place], [place]. 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 Her parents and  and is alive and well.     She has a number of allergies to foods (anaphylaxis with nuts, mouth itching with fruits and uncooked veggies).        5/7/2020: Halie lives with her mother in [place], [place]. She's a senior in [employer] and is currently doing distant learning due to the COVID-19 pandemic. She has a number of food allergies.          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  Lives at home with her family. She works 5 days per week at [employer] and at [employer].     Halie lives with her  in [facility].          Family History:   Father is  5 feet 11 inches tall.  Mother is  5 feet 4.5 inches tall.     Midparental Height is 5 feet 5.25 inches.    History of:  Adrenal insufficiency: none.  Autoimmune disease: none (no SLE, Crohn's, ulcerative colitis, celiac disease, .  Calcium problems: none.  Delayed puberty: none.  Diabetes mellitus: maternal great grandparents, and paternal great grandfather (T2D).  Early puberty: none.  Genetic disease: none.  Short stature: none.  Tall stature: none.  Thyroid disease: maternal second cousin (male), the father of whom has thyroid disease and is unrelated to Halie's mother.  Seasonal allergies: father and brother   MI: her father had a heart attack at about 56 years of age.  SVT: mother (status post ablation in Dec 2019)         Allergies:     Allergies   Allergen Reactions     Amoxicillin      Pcn [Penicillins]            "Medications:     Current Outpatient Medications   Medication Sig Dispense Refill     cetirizine (ZYRTEC) 5 MG tablet Take 5 mg by mouth daily       levothyroxine (SYNTHROID/LEVOTHROID) 125 MCG tablet Take 1 tablet (125 mcg) by mouth daily 30 tablet 11     norgestim-eth estrad triphasic (ORTHO TRI-CYCLEN) 0.18/0.215/0.25 MG-35 MCG tablet Take 1 tablet by mouth daily (Patient not taking: Reported on 12/9/2022)             Review of Systems:   Gen: Negative.  Eye: Negative.  ENT: Negative.  Pulmonary:  Negative.  Cardio: Negative.  Gastrointestinal: Negative.   Hematologic: Negative.  Genitourinary: Negative.  Musculoskeletal: Negative.  Psychiatric: anxiety. She is seeing a therapist every other week.   Neurologic: she reports loss of olfaction since she got COVID-19.  Skin: hirsutism (hair on the upper lip, chin and below the umbilicus which she shaves), stable.     Endocrine: see HPI.            Physical Exam:   Height 1.651 m (5' 5\"), weight 70.3 kg (155 lb), not currently breastfeeding.  Growth percentile SmartLinks can only be used for patients less than 20 years old.  Height: 5' 5\", Facility age limit for growth percentiles is 20 years.  Weight: 155 lbs 0 oz, Facility age limit for growth percentiles is 20 years.  BMI: Body mass index is 25.79 kg/m . Facility age limit for growth percentiles is 20 years.      Constitutional: awake, alert, cooperative, no apparent distress  Eyes:   Lids and lashes normal, sclera clear, conjunctiva normal. Normal extra ocular movements  ENT:    Normocephalic, without obvious abnormality, external ears without lesions  Neck:   Supple, symmetrical, trachea midline  Hematologic / Lymphatic: no cervical lymphadenopathy  Lungs: Breathing comfortably  Cardiovascular:  Well perfused.  Musculoskeletal: Motor strength and tone are normal.  Neurologic: Awake, alert, oriented to name, place and time.   Neuropsychiatric: normal  Skin: No evidence of rashes        Laboratory results: "     Component      Latest Ref Rng & Units 8/20/2018 9/4/2018 10/20/2018   TSH     0.4-4.5 mcU/mL 12.52 15.58 2.950- PCP changed levothyroxine dose from 50 to 44 mcg orally daily   T4 Free     0.9-1.6 ng/dL 1.05 1 1.68     At her PCP's office on 9/4/2018:    international unit(s)/mL (reference range 0-26)  Anti-thyroglobulin antibodies 13.3 international units/mL ( reference range 0-0.9)     Component      Latest Ref Rng & Units 4/4/2019- on 44 mcg of levothyroxine 10/24/2019- on 50 mcg levothyroxine 1/24/2020- on 62.5 mcg levothyroxine 4/29/2020- on 75 mcg of levothyroxine   TSH      0.40 - 4.00 mU/L 6.47 (H) 5.56 (H) 4.95 (H) 12.26 (H)   T4 Free      0.76 - 1.46 ng/dL 0.96 1.31 1.06 1     Component      Latest Ref Rng & Units 4/26/2021- on 100 mcg of levothyroxine daily   T4 Free      0.76 - 1.46 ng/dL 1.17   TSH      0.40 - 4.00 mU/L 4.62 (H)          Assessment and Plan:   1- Hashimoto's thyroiditis   2- Auto-immune hypothyroidism (due to Hashimoto's thyroiditis)  3- Encounter for long term use of medication    Halie is a now 22 year old female with a past medical history hypothyroidism in the context of Hashimoto's thyroiditis, who was started on levothyroxine in Sept 2018. She appears clinically euthyroid today Her most recent thyroid function tests on 7/11/2022 indicated that her dose -assuming good and regular intake- is inadequate.     Discussed symptoms of hypothyroidism, what Hashimoto's thyroiditis is, its diagnosis and natural history.     Finally, Halie has had a recent history of irregular periods (occurring every 30-40 days on average). Labs obtained by Dr. Antony this summer were all within normal range. She has not exhibited worsening hirsutism or rapid weight gain. I recommended to continue to monitor.    Plan:    - Reviewed previous lab results.  - Continue with current dose of Levothyroxine (verified that refills are available).  - Labs in Jan 2023 (6 months from her last check).  -  Reviewed signs and symptoms of hypo / hyperthyroidism and when to contact our clinic.  - Reviewed previous recommendations by Dr. Antony of providers in the [place] area.  - Requested for Halie to begin to do her search, once she decides on one, she can contact us and we will place the referral to them.      Orders Placed This Encounter   Procedures     TSH     T4 free     The plan had been discussed in detail with Halie who are in agreement. Thank you for allowing me to participate in the care of Halie.  Please do not hesitate to call with questions or concerns.    Sincerely,    Riaz Hernandez MD  Division of Pediatric Endocrinology  [facility]  Phone: [phone]  Fax: [phone]     Total time including review of medical records, history, physical examination, counselling, and coordination of care concerning one or more of the diagnoses listed under the assessment and plan took 25 minutes. I counseled the patient about the nature of the condition(s), options of therapy. All parent questions were answered and parent(s) understood and agreed with the plan.     CC    Patient Care Team:  Saskia Bennett MD as PCP - General (Pediatrics)   Copy to patient  CHER ORTIZ DONALD  [id number] [facility] [id number]

## 2023-06-09 ENCOUNTER — LAB (OUTPATIENT)
Dept: LAB | Facility: CLINIC | Age: 23
End: 2023-06-09
Payer: COMMERCIAL

## 2023-06-09 DIAGNOSIS — Z79.899 ENCOUNTER FOR LONG-TERM CURRENT USE OF MEDICATION: ICD-10-CM

## 2023-06-09 DIAGNOSIS — E06.3 HYPOTHYROIDISM DUE TO HASHIMOTO'S THYROIDITIS: ICD-10-CM

## 2023-06-09 LAB
T4 FREE SERPL-MCNC: 1.3 NG/DL (ref 0.9–1.7)
TSH SERPL DL<=0.005 MIU/L-ACNC: 4.04 UIU/ML (ref 0.3–4.2)

## 2023-06-09 PROCEDURE — 84443 ASSAY THYROID STIM HORMONE: CPT

## 2023-06-09 PROCEDURE — 36415 COLL VENOUS BLD VENIPUNCTURE: CPT

## 2023-06-09 PROCEDURE — 84439 ASSAY OF FREE THYROXINE: CPT

## 2023-06-09 RX ORDER — LEVOTHYROXINE SODIUM 150 UG/1
150 TABLET ORAL DAILY
Qty: 30 TABLET | Refills: 0 | Status: SHIPPED | OUTPATIENT
Start: 2023-06-09 | End: 2023-06-18

## 2023-06-21 ENCOUNTER — LAB REQUISITION (OUTPATIENT)
Dept: LAB | Facility: CLINIC | Age: 23
End: 2023-06-21
Payer: COMMERCIAL

## 2023-06-21 ENCOUNTER — LAB REQUISITION (OUTPATIENT)
Dept: LAB | Facility: CLINIC | Age: 23
End: 2023-06-21

## 2023-06-21 DIAGNOSIS — Z34.81 ENCOUNTER FOR SUPERVISION OF OTHER NORMAL PREGNANCY, FIRST TRIMESTER: ICD-10-CM

## 2023-06-21 DIAGNOSIS — Z12.4 ENCOUNTER FOR SCREENING FOR MALIGNANT NEOPLASM OF CERVIX: ICD-10-CM

## 2023-06-21 DIAGNOSIS — E06.3 AUTOIMMUNE THYROIDITIS: ICD-10-CM

## 2023-06-21 LAB
ABO/RH(D): NORMAL
ANTIBODY SCREEN: NEGATIVE
BASOPHILS # BLD AUTO: 0 10E3/UL (ref 0–0.2)
BASOPHILS NFR BLD AUTO: 1 %
EOSINOPHIL # BLD AUTO: 0 10E3/UL (ref 0–0.7)
EOSINOPHIL NFR BLD AUTO: 1 %
ERYTHROCYTE [DISTWIDTH] IN BLOOD BY AUTOMATED COUNT: 13 % (ref 10–15)
HCT VFR BLD AUTO: 40.3 % (ref 35–47)
HGB BLD-MCNC: 13.1 G/DL (ref 11.7–15.7)
HOLD SPECIMEN: NORMAL
IMM GRANULOCYTES # BLD: 0 10E3/UL
IMM GRANULOCYTES NFR BLD: 1 %
LYMPHOCYTES # BLD AUTO: 1.2 10E3/UL (ref 0.8–5.3)
LYMPHOCYTES NFR BLD AUTO: 18 %
MCH RBC QN AUTO: 28.2 PG (ref 26.5–33)
MCHC RBC AUTO-ENTMCNC: 32.5 G/DL (ref 31.5–36.5)
MCV RBC AUTO: 87 FL (ref 78–100)
MONOCYTES # BLD AUTO: 0.5 10E3/UL (ref 0–1.3)
MONOCYTES NFR BLD AUTO: 8 %
NEUTROPHILS # BLD AUTO: 4.7 10E3/UL (ref 1.6–8.3)
NEUTROPHILS NFR BLD AUTO: 71 %
NRBC # BLD AUTO: 0 10E3/UL
NRBC BLD AUTO-RTO: 0 /100
PLATELET # BLD AUTO: 230 10E3/UL (ref 150–450)
RBC # BLD AUTO: 4.65 10E6/UL (ref 3.8–5.2)
SPECIMEN EXPIRATION DATE: NORMAL
T4 FREE SERPL-MCNC: 1.21 NG/DL (ref 0.9–1.7)
TSH SERPL DL<=0.005 MIU/L-ACNC: 10.7 UIU/ML (ref 0.3–4.2)
WBC # BLD AUTO: 6.6 10E3/UL (ref 4–11)

## 2023-06-21 PROCEDURE — 84443 ASSAY THYROID STIM HORMONE: CPT

## 2023-06-21 PROCEDURE — 87340 HEPATITIS B SURFACE AG IA: CPT | Mod: ORL

## 2023-06-21 PROCEDURE — 86901 BLOOD TYPING SEROLOGIC RH(D): CPT | Mod: ORL

## 2023-06-21 PROCEDURE — 87491 CHLMYD TRACH DNA AMP PROBE: CPT | Mod: ORL

## 2023-06-21 PROCEDURE — 86762 RUBELLA ANTIBODY: CPT | Mod: ORL

## 2023-06-21 PROCEDURE — 87086 URINE CULTURE/COLONY COUNT: CPT | Mod: ORL

## 2023-06-21 PROCEDURE — 84439 ASSAY OF FREE THYROXINE: CPT

## 2023-06-21 PROCEDURE — 86803 HEPATITIS C AB TEST: CPT | Mod: ORL

## 2023-06-21 PROCEDURE — 86850 RBC ANTIBODY SCREEN: CPT | Mod: ORL

## 2023-06-21 PROCEDURE — G0145 SCR C/V CYTO,THINLAYER,RESCR: HCPCS | Mod: ORL

## 2023-06-21 PROCEDURE — 86592 SYPHILIS TEST NON-TREP QUAL: CPT | Mod: ORL

## 2023-06-21 PROCEDURE — 87591 N.GONORRHOEAE DNA AMP PROB: CPT | Mod: ORL

## 2023-06-21 PROCEDURE — 87389 HIV-1 AG W/HIV-1&-2 AB AG IA: CPT | Mod: ORL

## 2023-06-21 PROCEDURE — 85025 COMPLETE CBC W/AUTO DIFF WBC: CPT | Mod: ORL

## 2023-06-22 LAB
C TRACH DNA SPEC QL PROBE+SIG AMP: NEGATIVE
HBV SURFACE AG SERPL QL IA: NONREACTIVE
HCV AB SERPL QL IA: NONREACTIVE
HIV 1+2 AB+HIV1 P24 AG SERPL QL IA: NONREACTIVE
N GONORRHOEA DNA SPEC QL NAA+PROBE: NEGATIVE
RPR SER QL: NONREACTIVE
RUBV IGG SERPL QL IA: 4.17 INDEX
RUBV IGG SERPL QL IA: POSITIVE

## 2023-06-23 LAB
BACTERIA UR CULT: NO GROWTH
BKR LAB AP GYN ADEQUACY: NORMAL
BKR LAB AP GYN INTERPRETATION: NORMAL
BKR LAB AP HPV REFLEX: NORMAL
BKR LAB AP LMP: NORMAL
BKR LAB AP PREVIOUS ABNL DX: NORMAL
BKR LAB AP PREVIOUS ABNORMAL: NORMAL
PATH REPORT.COMMENTS IMP SPEC: NORMAL
PATH REPORT.COMMENTS IMP SPEC: NORMAL
PATH REPORT.RELEVANT HX SPEC: NORMAL

## 2023-07-11 DIAGNOSIS — E06.3 HYPOTHYROIDISM DUE TO HASHIMOTO'S THYROIDITIS: ICD-10-CM

## 2023-07-11 NOTE — TELEPHONE ENCOUNTER
Jaime Kruger,     Can you confirm with Halie about this refill? It was my understanding that her dose had been increased to 200 mcg by her gynecology team. I don't see any subsequent lab results.     Thanks,     Riaz

## 2023-08-02 ENCOUNTER — LAB (OUTPATIENT)
Dept: LAB | Facility: CLINIC | Age: 23
End: 2023-08-02
Payer: COMMERCIAL

## 2023-08-02 DIAGNOSIS — E03.9 HYPOTHYROIDISM: Primary | ICD-10-CM

## 2023-08-02 LAB
T4 FREE SERPL-MCNC: 1.66 NG/DL (ref 0.9–1.7)
TSH SERPL DL<=0.005 MIU/L-ACNC: 3.54 UIU/ML (ref 0.3–4.2)

## 2023-08-02 PROCEDURE — 84439 ASSAY OF FREE THYROXINE: CPT

## 2023-08-02 PROCEDURE — 36415 COLL VENOUS BLD VENIPUNCTURE: CPT

## 2023-08-02 PROCEDURE — 84443 ASSAY THYROID STIM HORMONE: CPT

## 2023-10-09 RX ORDER — LEVOTHYROXINE SODIUM 150 UG/1
TABLET ORAL
Qty: 30 TABLET | Refills: 0 | OUTPATIENT
Start: 2023-10-09

## 2023-11-25 ENCOUNTER — HEALTH MAINTENANCE LETTER (OUTPATIENT)
Age: 23
End: 2023-11-25

## 2024-01-02 ENCOUNTER — LAB REQUISITION (OUTPATIENT)
Dept: LAB | Facility: CLINIC | Age: 24
End: 2024-01-02
Payer: MEDICAID

## 2024-01-02 DIAGNOSIS — Z3A.36 36 WEEKS GESTATION OF PREGNANCY: ICD-10-CM

## 2024-01-02 PROCEDURE — 87653 STREP B DNA AMP PROBE: CPT | Mod: ORL | Performed by: OBSTETRICS & GYNECOLOGY

## 2024-01-03 LAB — GP B STREP DNA SPEC QL NAA+PROBE: NEGATIVE

## 2024-02-07 ENCOUNTER — HOSPITAL ENCOUNTER (INPATIENT)
Facility: CLINIC | Age: 24
LOS: 5 days | Discharge: HOME HEALTH | End: 2024-02-12
Attending: OBSTETRICS & GYNECOLOGY | Admitting: OBSTETRICS & GYNECOLOGY
Payer: MEDICAID

## 2024-02-07 DIAGNOSIS — I10 HYPERTENSION, UNSPECIFIED TYPE: ICD-10-CM

## 2024-02-07 DIAGNOSIS — D62 ANEMIA DUE TO BLOOD LOSS, ACUTE: ICD-10-CM

## 2024-02-07 DIAGNOSIS — Z98.891 S/P CESAREAN SECTION: Primary | ICD-10-CM

## 2024-02-07 LAB
ABO/RH(D): NORMAL
ANTIBODY SCREEN: NEGATIVE
ERYTHROCYTE [DISTWIDTH] IN BLOOD BY AUTOMATED COUNT: 13.8 % (ref 10–15)
HCT VFR BLD AUTO: 38.3 % (ref 35–47)
HGB BLD-MCNC: 12.6 G/DL (ref 11.7–15.7)
MCH RBC QN AUTO: 27.3 PG (ref 26.5–33)
MCHC RBC AUTO-ENTMCNC: 32.9 G/DL (ref 31.5–36.5)
MCV RBC AUTO: 83 FL (ref 78–100)
PLATELET # BLD AUTO: 220 10E3/UL (ref 150–450)
RBC # BLD AUTO: 4.61 10E6/UL (ref 3.8–5.2)
SPECIMEN EXPIRATION DATE: NORMAL
WBC # BLD AUTO: 10.3 10E3/UL (ref 4–11)

## 2024-02-07 PROCEDURE — 86900 BLOOD TYPING SEROLOGIC ABO: CPT | Performed by: OBSTETRICS & GYNECOLOGY

## 2024-02-07 PROCEDURE — 120N000001 HC R&B MED SURG/OB

## 2024-02-07 PROCEDURE — 36415 COLL VENOUS BLD VENIPUNCTURE: CPT | Performed by: OBSTETRICS & GYNECOLOGY

## 2024-02-07 PROCEDURE — 86780 TREPONEMA PALLIDUM: CPT | Performed by: OBSTETRICS & GYNECOLOGY

## 2024-02-07 PROCEDURE — 82040 ASSAY OF SERUM ALBUMIN: CPT | Performed by: OBSTETRICS & GYNECOLOGY

## 2024-02-07 PROCEDURE — 85018 HEMOGLOBIN: CPT | Performed by: OBSTETRICS & GYNECOLOGY

## 2024-02-07 PROCEDURE — 250N000013 HC RX MED GY IP 250 OP 250 PS 637: Performed by: OBSTETRICS & GYNECOLOGY

## 2024-02-07 RX ORDER — LOPERAMIDE HCL 2 MG
4 CAPSULE ORAL
Status: DISCONTINUED | OUTPATIENT
Start: 2024-02-07 | End: 2024-02-09

## 2024-02-07 RX ORDER — ONDANSETRON 4 MG/1
4 TABLET, ORALLY DISINTEGRATING ORAL EVERY 6 HOURS PRN
Status: DISCONTINUED | OUTPATIENT
Start: 2024-02-07 | End: 2024-02-09

## 2024-02-07 RX ORDER — SODIUM CHLORIDE, SODIUM LACTATE, POTASSIUM CHLORIDE, CALCIUM CHLORIDE 600; 310; 30; 20 MG/100ML; MG/100ML; MG/100ML; MG/100ML
INJECTION, SOLUTION INTRAVENOUS CONTINUOUS
Status: DISCONTINUED | OUTPATIENT
Start: 2024-02-07 | End: 2024-02-09

## 2024-02-07 RX ORDER — MISOPROSTOL 200 UG/1
800 TABLET ORAL
Status: DISCONTINUED | OUTPATIENT
Start: 2024-02-07 | End: 2024-02-09

## 2024-02-07 RX ORDER — CITRIC ACID/SODIUM CITRATE 334-500MG
30 SOLUTION, ORAL ORAL
Status: DISCONTINUED | OUTPATIENT
Start: 2024-02-07 | End: 2024-02-09

## 2024-02-07 RX ORDER — PROCHLORPERAZINE MALEATE 10 MG
10 TABLET ORAL EVERY 6 HOURS PRN
Status: DISCONTINUED | OUTPATIENT
Start: 2024-02-07 | End: 2024-02-09

## 2024-02-07 RX ORDER — METOCLOPRAMIDE 10 MG/1
10 TABLET ORAL EVERY 6 HOURS PRN
Status: DISCONTINUED | OUTPATIENT
Start: 2024-02-07 | End: 2024-02-09

## 2024-02-07 RX ORDER — ONDANSETRON 2 MG/ML
4 INJECTION INTRAMUSCULAR; INTRAVENOUS EVERY 6 HOURS PRN
Status: DISCONTINUED | OUTPATIENT
Start: 2024-02-07 | End: 2024-02-09

## 2024-02-07 RX ORDER — NALOXONE HYDROCHLORIDE 0.4 MG/ML
0.4 INJECTION, SOLUTION INTRAMUSCULAR; INTRAVENOUS; SUBCUTANEOUS
Status: DISCONTINUED | OUTPATIENT
Start: 2024-02-07 | End: 2024-02-09

## 2024-02-07 RX ORDER — METOCLOPRAMIDE HYDROCHLORIDE 5 MG/ML
10 INJECTION INTRAMUSCULAR; INTRAVENOUS EVERY 6 HOURS PRN
Status: DISCONTINUED | OUTPATIENT
Start: 2024-02-07 | End: 2024-02-09

## 2024-02-07 RX ORDER — LIDOCAINE 40 MG/G
CREAM TOPICAL
Status: DISCONTINUED | OUTPATIENT
Start: 2024-02-07 | End: 2024-02-09

## 2024-02-07 RX ORDER — CARBOPROST TROMETHAMINE 250 UG/ML
250 INJECTION, SOLUTION INTRAMUSCULAR
Status: DISCONTINUED | OUTPATIENT
Start: 2024-02-07 | End: 2024-02-09

## 2024-02-07 RX ORDER — NALOXONE HYDROCHLORIDE 0.4 MG/ML
0.2 INJECTION, SOLUTION INTRAMUSCULAR; INTRAVENOUS; SUBCUTANEOUS
Status: DISCONTINUED | OUTPATIENT
Start: 2024-02-07 | End: 2024-02-09

## 2024-02-07 RX ORDER — PROCHLORPERAZINE 25 MG
25 SUPPOSITORY, RECTAL RECTAL EVERY 12 HOURS PRN
Status: DISCONTINUED | OUTPATIENT
Start: 2024-02-07 | End: 2024-02-09

## 2024-02-07 RX ORDER — OXYTOCIN 10 [USP'U]/ML
10 INJECTION, SOLUTION INTRAMUSCULAR; INTRAVENOUS
Status: DISCONTINUED | OUTPATIENT
Start: 2024-02-07 | End: 2024-02-09

## 2024-02-07 RX ORDER — KETOROLAC TROMETHAMINE 30 MG/ML
30 INJECTION, SOLUTION INTRAMUSCULAR; INTRAVENOUS
Status: DISCONTINUED | OUTPATIENT
Start: 2024-02-07 | End: 2024-02-09

## 2024-02-07 RX ORDER — LOPERAMIDE HCL 2 MG
2 CAPSULE ORAL
Status: DISCONTINUED | OUTPATIENT
Start: 2024-02-07 | End: 2024-02-09

## 2024-02-07 RX ORDER — OXYTOCIN/0.9 % SODIUM CHLORIDE 30/500 ML
340 PLASTIC BAG, INJECTION (ML) INTRAVENOUS CONTINUOUS PRN
Status: DISCONTINUED | OUTPATIENT
Start: 2024-02-07 | End: 2024-02-09

## 2024-02-07 RX ORDER — METHYLERGONOVINE MALEATE 0.2 MG/ML
200 INJECTION INTRAVENOUS
Status: DISCONTINUED | OUTPATIENT
Start: 2024-02-07 | End: 2024-02-09

## 2024-02-07 RX ORDER — ACETAMINOPHEN 325 MG/1
650 TABLET ORAL EVERY 4 HOURS PRN
Status: DISCONTINUED | OUTPATIENT
Start: 2024-02-07 | End: 2024-02-09

## 2024-02-07 RX ORDER — MISOPROSTOL 100 UG/1
25 TABLET ORAL
Status: DISCONTINUED | OUTPATIENT
Start: 2024-02-07 | End: 2024-02-09

## 2024-02-07 RX ORDER — OXYTOCIN/0.9 % SODIUM CHLORIDE 30/500 ML
100-340 PLASTIC BAG, INJECTION (ML) INTRAVENOUS CONTINUOUS PRN
Status: DISCONTINUED | OUTPATIENT
Start: 2024-02-07 | End: 2024-02-09

## 2024-02-07 RX ORDER — IBUPROFEN 800 MG/1
800 TABLET, FILM COATED ORAL
Status: DISCONTINUED | OUTPATIENT
Start: 2024-02-07 | End: 2024-02-09

## 2024-02-07 RX ORDER — MISOPROSTOL 200 UG/1
400 TABLET ORAL
Status: DISCONTINUED | OUTPATIENT
Start: 2024-02-07 | End: 2024-02-09

## 2024-02-07 RX ORDER — FENTANYL CITRATE 50 UG/ML
100 INJECTION, SOLUTION INTRAMUSCULAR; INTRAVENOUS
Status: DISCONTINUED | OUTPATIENT
Start: 2024-02-07 | End: 2024-02-09

## 2024-02-07 RX ORDER — HYDROXYZINE HYDROCHLORIDE 50 MG/1
50 TABLET, FILM COATED ORAL
Status: DISCONTINUED | OUTPATIENT
Start: 2024-02-07 | End: 2024-02-09

## 2024-02-07 RX ORDER — TRANEXAMIC ACID 10 MG/ML
1 INJECTION, SOLUTION INTRAVENOUS EVERY 30 MIN PRN
Status: DISCONTINUED | OUTPATIENT
Start: 2024-02-07 | End: 2024-02-09

## 2024-02-07 RX ADMIN — HYDROXYZINE HYDROCHLORIDE 50 MG: 50 TABLET, FILM COATED ORAL at 23:16

## 2024-02-07 RX ADMIN — MISOPROSTOL 25 MCG: 100 TABLET ORAL at 23:16

## 2024-02-07 ASSESSMENT — ACTIVITIES OF DAILY LIVING (ADL)
ADLS_ACUITY_SCORE: 20
ADLS_ACUITY_SCORE: 20

## 2024-02-08 ENCOUNTER — ANESTHESIA (OUTPATIENT)
Dept: OBGYN | Facility: CLINIC | Age: 24
End: 2024-02-08
Payer: MEDICAID

## 2024-02-08 ENCOUNTER — ANESTHESIA EVENT (OUTPATIENT)
Dept: OBGYN | Facility: CLINIC | Age: 24
End: 2024-02-08
Payer: MEDICAID

## 2024-02-08 LAB
ALBUMIN MFR UR ELPH: <6 MG/DL
ALBUMIN SERPL BCG-MCNC: 3.3 G/DL (ref 3.5–5.2)
ALP SERPL-CCNC: 146 U/L (ref 40–150)
ALT SERPL W P-5'-P-CCNC: 13 U/L (ref 0–50)
ANION GAP SERPL CALCULATED.3IONS-SCNC: 13 MMOL/L (ref 7–15)
AST SERPL W P-5'-P-CCNC: 38 U/L (ref 0–45)
BILIRUB SERPL-MCNC: 0.2 MG/DL
BUN SERPL-MCNC: 10.7 MG/DL (ref 6–20)
CALCIUM SERPL-MCNC: 9.2 MG/DL (ref 8.6–10)
CHLORIDE SERPL-SCNC: 102 MMOL/L (ref 98–107)
CREAT SERPL-MCNC: 0.76 MG/DL (ref 0.51–0.95)
CREAT UR-MCNC: 45.8 MG/DL
DEPRECATED HCO3 PLAS-SCNC: 20 MMOL/L (ref 22–29)
EGFRCR SERPLBLD CKD-EPI 2021: >90 ML/MIN/1.73M2
GLUCOSE SERPL-MCNC: 82 MG/DL (ref 70–99)
HOLD SPECIMEN: NORMAL
POTASSIUM SERPL-SCNC: 4.4 MMOL/L (ref 3.4–5.3)
PROT SERPL-MCNC: 6.5 G/DL (ref 6.4–8.3)
PROT/CREAT 24H UR: NORMAL MG/G{CREAT}
SODIUM SERPL-SCNC: 135 MMOL/L (ref 135–145)
T PALLIDUM AB SER QL: NONREACTIVE

## 2024-02-08 PROCEDURE — 3E0R3BZ INTRODUCTION OF ANESTHETIC AGENT INTO SPINAL CANAL, PERCUTANEOUS APPROACH: ICD-10-PCS | Performed by: ANESTHESIOLOGY

## 2024-02-08 PROCEDURE — 250N000009 HC RX 250: Performed by: NURSE ANESTHETIST, CERTIFIED REGISTERED

## 2024-02-08 PROCEDURE — 258N000003 HC RX IP 258 OP 636: Performed by: OBSTETRICS & GYNECOLOGY

## 2024-02-08 PROCEDURE — 250N000011 HC RX IP 250 OP 636: Performed by: ANESTHESIOLOGY

## 2024-02-08 PROCEDURE — 10907ZC DRAINAGE OF AMNIOTIC FLUID, THERAPEUTIC FROM PRODUCTS OF CONCEPTION, VIA NATURAL OR ARTIFICIAL OPENING: ICD-10-PCS | Performed by: OBSTETRICS & GYNECOLOGY

## 2024-02-08 PROCEDURE — 250N000013 HC RX MED GY IP 250 OP 250 PS 637: Performed by: OBSTETRICS & GYNECOLOGY

## 2024-02-08 PROCEDURE — 370N000017 HC ANESTHESIA TECHNICAL FEE, PER MIN: Performed by: OBSTETRICS & GYNECOLOGY

## 2024-02-08 PROCEDURE — 258N000003 HC RX IP 258 OP 636: Performed by: NURSE ANESTHETIST, CERTIFIED REGISTERED

## 2024-02-08 PROCEDURE — 360N000076 HC SURGERY LEVEL 3, PER MIN: Performed by: OBSTETRICS & GYNECOLOGY

## 2024-02-08 PROCEDURE — 250N000011 HC RX IP 250 OP 636

## 2024-02-08 PROCEDURE — 00HU33Z INSERTION OF INFUSION DEVICE INTO SPINAL CANAL, PERCUTANEOUS APPROACH: ICD-10-PCS | Performed by: ANESTHESIOLOGY

## 2024-02-08 PROCEDURE — 84156 ASSAY OF PROTEIN URINE: CPT | Performed by: OBSTETRICS & GYNECOLOGY

## 2024-02-08 PROCEDURE — 272N000001 HC OR GENERAL SUPPLY STERILE: Performed by: OBSTETRICS & GYNECOLOGY

## 2024-02-08 PROCEDURE — 250N000009 HC RX 250: Performed by: OBSTETRICS & GYNECOLOGY

## 2024-02-08 PROCEDURE — 250N000011 HC RX IP 250 OP 636: Performed by: NURSE ANESTHETIST, CERTIFIED REGISTERED

## 2024-02-08 PROCEDURE — 710N000009 HC RECOVERY PHASE 1, LEVEL 1, PER MIN: Performed by: OBSTETRICS & GYNECOLOGY

## 2024-02-08 PROCEDURE — 250N000011 HC RX IP 250 OP 636: Performed by: OBSTETRICS & GYNECOLOGY

## 2024-02-08 PROCEDURE — 120N000001 HC R&B MED SURG/OB

## 2024-02-08 RX ORDER — OXYTOCIN/0.9 % SODIUM CHLORIDE 30/500 ML
PLASTIC BAG, INJECTION (ML) INTRAVENOUS CONTINUOUS PRN
Status: DISCONTINUED | OUTPATIENT
Start: 2024-02-08 | End: 2024-02-08

## 2024-02-08 RX ORDER — OXYTOCIN/0.9 % SODIUM CHLORIDE 30/500 ML
100-340 PLASTIC BAG, INJECTION (ML) INTRAVENOUS CONTINUOUS PRN
Status: DISCONTINUED | OUTPATIENT
Start: 2024-02-08 | End: 2024-02-09

## 2024-02-08 RX ORDER — FENTANYL CITRATE 50 UG/ML
100 INJECTION, SOLUTION INTRAMUSCULAR; INTRAVENOUS ONCE
Status: COMPLETED | OUTPATIENT
Start: 2024-02-08 | End: 2024-02-08

## 2024-02-08 RX ORDER — SCOLOPAMINE TRANSDERMAL SYSTEM 1 MG/1
1 PATCH, EXTENDED RELEASE TRANSDERMAL
Status: DISCONTINUED | OUTPATIENT
Start: 2024-02-08 | End: 2024-02-10

## 2024-02-08 RX ORDER — KETOROLAC TROMETHAMINE 30 MG/ML
INJECTION, SOLUTION INTRAMUSCULAR; INTRAVENOUS PRN
Status: DISCONTINUED | OUTPATIENT
Start: 2024-02-08 | End: 2024-02-08

## 2024-02-08 RX ORDER — BUPIVACAINE HYDROCHLORIDE 2.5 MG/ML
10 INJECTION, SOLUTION EPIDURAL; INFILTRATION; INTRACAUDAL ONCE
Status: DISCONTINUED | OUTPATIENT
Start: 2024-02-08 | End: 2024-02-10

## 2024-02-08 RX ORDER — ONDANSETRON 2 MG/ML
INJECTION INTRAMUSCULAR; INTRAVENOUS PRN
Status: DISCONTINUED | OUTPATIENT
Start: 2024-02-08 | End: 2024-02-08

## 2024-02-08 RX ORDER — SODIUM CHLORIDE, SODIUM LACTATE, POTASSIUM CHLORIDE, CALCIUM CHLORIDE 600; 310; 30; 20 MG/100ML; MG/100ML; MG/100ML; MG/100ML
INJECTION, SOLUTION INTRAVENOUS CONTINUOUS PRN
Status: DISCONTINUED | OUTPATIENT
Start: 2024-02-08 | End: 2024-02-09

## 2024-02-08 RX ORDER — CITRIC ACID/SODIUM CITRATE 334-500MG
30 SOLUTION, ORAL ORAL
Status: COMPLETED | OUTPATIENT
Start: 2024-02-08 | End: 2024-02-08

## 2024-02-08 RX ORDER — TRANEXAMIC ACID 10 MG/ML
1 INJECTION, SOLUTION INTRAVENOUS EVERY 30 MIN PRN
Status: DISCONTINUED | OUTPATIENT
Start: 2024-02-08 | End: 2024-02-09

## 2024-02-08 RX ORDER — SODIUM CHLORIDE, SODIUM LACTATE, POTASSIUM CHLORIDE, CALCIUM CHLORIDE 600; 310; 30; 20 MG/100ML; MG/100ML; MG/100ML; MG/100ML
INJECTION, SOLUTION INTRAVENOUS CONTINUOUS PRN
Status: DISCONTINUED | OUTPATIENT
Start: 2024-02-08 | End: 2024-02-08

## 2024-02-08 RX ORDER — OXYTOCIN 10 [USP'U]/ML
10 INJECTION, SOLUTION INTRAMUSCULAR; INTRAVENOUS
Status: DISCONTINUED | OUTPATIENT
Start: 2024-02-08 | End: 2024-02-09

## 2024-02-08 RX ORDER — NALBUPHINE HYDROCHLORIDE 20 MG/ML
2.5-5 INJECTION, SOLUTION INTRAMUSCULAR; INTRAVENOUS; SUBCUTANEOUS EVERY 6 HOURS PRN
Status: DISCONTINUED | OUTPATIENT
Start: 2024-02-08 | End: 2024-02-09

## 2024-02-08 RX ORDER — DEXAMETHASONE SODIUM PHOSPHATE 4 MG/ML
INJECTION, SOLUTION INTRA-ARTICULAR; INTRALESIONAL; INTRAMUSCULAR; INTRAVENOUS; SOFT TISSUE PRN
Status: DISCONTINUED | OUTPATIENT
Start: 2024-02-08 | End: 2024-02-08

## 2024-02-08 RX ORDER — CHLOROPROCAINE HYDROCHLORIDE 30 MG/ML
INJECTION, SOLUTION EPIDURAL; INFILTRATION; INTRACAUDAL; PERINEURAL PRN
Status: DISCONTINUED | OUTPATIENT
Start: 2024-02-08 | End: 2024-02-08

## 2024-02-08 RX ORDER — LEVOTHYROXINE SODIUM 175 UG/1
175 TABLET ORAL
Status: DISCONTINUED | OUTPATIENT
Start: 2024-02-08 | End: 2024-02-08

## 2024-02-08 RX ORDER — CARBOPROST TROMETHAMINE 250 UG/ML
250 INJECTION, SOLUTION INTRAMUSCULAR
Status: DISCONTINUED | OUTPATIENT
Start: 2024-02-08 | End: 2024-02-09

## 2024-02-08 RX ORDER — METHYLERGONOVINE MALEATE 0.2 MG/ML
200 INJECTION INTRAVENOUS
Status: DISCONTINUED | OUTPATIENT
Start: 2024-02-08 | End: 2024-02-09

## 2024-02-08 RX ORDER — LIDOCAINE 40 MG/G
CREAM TOPICAL
Status: DISCONTINUED | OUTPATIENT
Start: 2024-02-08 | End: 2024-02-09

## 2024-02-08 RX ORDER — FENTANYL CITRATE 50 UG/ML
INJECTION, SOLUTION INTRAMUSCULAR; INTRAVENOUS PRN
Status: DISCONTINUED | OUTPATIENT
Start: 2024-02-08 | End: 2024-02-08

## 2024-02-08 RX ORDER — ONDANSETRON 4 MG/1
4 TABLET, ORALLY DISINTEGRATING ORAL EVERY 6 HOURS PRN
Status: DISCONTINUED | OUTPATIENT
Start: 2024-02-08 | End: 2024-02-09

## 2024-02-08 RX ORDER — FENTANYL CITRATE 50 UG/ML
INJECTION, SOLUTION INTRAMUSCULAR; INTRAVENOUS
Status: COMPLETED | OUTPATIENT
Start: 2024-02-08 | End: 2024-02-08

## 2024-02-08 RX ORDER — MORPHINE SULFATE 1 MG/ML
INJECTION, SOLUTION EPIDURAL; INTRATHECAL; INTRAVENOUS PRN
Status: DISCONTINUED | OUTPATIENT
Start: 2024-02-08 | End: 2024-02-08

## 2024-02-08 RX ORDER — AZITHROMYCIN 500 MG/5ML
500 INJECTION, POWDER, LYOPHILIZED, FOR SOLUTION INTRAVENOUS
Status: COMPLETED | OUTPATIENT
Start: 2024-02-08 | End: 2024-02-08

## 2024-02-08 RX ORDER — FENTANYL CITRATE 50 UG/ML
INJECTION, SOLUTION INTRAMUSCULAR; INTRAVENOUS
Status: COMPLETED
Start: 2024-02-08 | End: 2024-02-08

## 2024-02-08 RX ORDER — ACETAMINOPHEN 325 MG/1
975 TABLET ORAL ONCE
Status: COMPLETED | OUTPATIENT
Start: 2024-02-08 | End: 2024-02-08

## 2024-02-08 RX ORDER — FENTANYL CITRATE-0.9 % NACL/PF 10 MCG/ML
100 PLASTIC BAG, INJECTION (ML) INTRAVENOUS EVERY 5 MIN PRN
Status: DISCONTINUED | OUTPATIENT
Start: 2024-02-08 | End: 2024-02-10

## 2024-02-08 RX ORDER — OXYTOCIN/0.9 % SODIUM CHLORIDE 30/500 ML
340 PLASTIC BAG, INJECTION (ML) INTRAVENOUS CONTINUOUS PRN
Status: DISCONTINUED | OUTPATIENT
Start: 2024-02-08 | End: 2024-02-09

## 2024-02-08 RX ORDER — MISOPROSTOL 200 UG/1
400 TABLET ORAL
Status: DISCONTINUED | OUTPATIENT
Start: 2024-02-08 | End: 2024-02-09

## 2024-02-08 RX ORDER — OXYTOCIN/0.9 % SODIUM CHLORIDE 30/500 ML
1-24 PLASTIC BAG, INJECTION (ML) INTRAVENOUS CONTINUOUS
Status: DISCONTINUED | OUTPATIENT
Start: 2024-02-08 | End: 2024-02-09

## 2024-02-08 RX ORDER — LIDOCAINE 40 MG/G
CREAM TOPICAL
Status: DISCONTINUED | OUTPATIENT
Start: 2024-02-08 | End: 2024-02-08

## 2024-02-08 RX ORDER — NALBUPHINE HYDROCHLORIDE 20 MG/ML
2.5-5 INJECTION, SOLUTION INTRAMUSCULAR; INTRAVENOUS; SUBCUTANEOUS EVERY 6 HOURS PRN
Status: DISCONTINUED | OUTPATIENT
Start: 2024-02-08 | End: 2024-02-10

## 2024-02-08 RX ORDER — LOPERAMIDE HCL 2 MG
2 CAPSULE ORAL
Status: DISCONTINUED | OUTPATIENT
Start: 2024-02-08 | End: 2024-02-09

## 2024-02-08 RX ORDER — LOPERAMIDE HCL 2 MG
4 CAPSULE ORAL
Status: DISCONTINUED | OUTPATIENT
Start: 2024-02-08 | End: 2024-02-09

## 2024-02-08 RX ORDER — CLINDAMYCIN PHOSPHATE 900 MG/50ML
900 INJECTION, SOLUTION INTRAVENOUS
Status: COMPLETED | OUTPATIENT
Start: 2024-02-08 | End: 2024-02-08

## 2024-02-08 RX ORDER — ONDANSETRON 2 MG/ML
4 INJECTION INTRAMUSCULAR; INTRAVENOUS EVERY 6 HOURS PRN
Status: DISCONTINUED | OUTPATIENT
Start: 2024-02-08 | End: 2024-02-09

## 2024-02-08 RX ORDER — SODIUM CHLORIDE, SODIUM LACTATE, POTASSIUM CHLORIDE, CALCIUM CHLORIDE 600; 310; 30; 20 MG/100ML; MG/100ML; MG/100ML; MG/100ML
INJECTION, SOLUTION INTRAVENOUS CONTINUOUS
Status: DISCONTINUED | OUTPATIENT
Start: 2024-02-08 | End: 2024-02-09

## 2024-02-08 RX ORDER — MISOPROSTOL 200 UG/1
800 TABLET ORAL
Status: DISCONTINUED | OUTPATIENT
Start: 2024-02-08 | End: 2024-02-09

## 2024-02-08 RX ADMIN — Medication: at 17:32

## 2024-02-08 RX ADMIN — LIDOCAINE HYDROCHLORIDE 5 ML: 20 INJECTION, SOLUTION EPIDURAL; INFILTRATION; INTRACAUDAL; PERINEURAL at 20:57

## 2024-02-08 RX ADMIN — KETOROLAC TROMETHAMINE 30 MG: 30 INJECTION, SOLUTION INTRAMUSCULAR at 21:32

## 2024-02-08 RX ADMIN — SODIUM CHLORIDE, POTASSIUM CHLORIDE, SODIUM LACTATE AND CALCIUM CHLORIDE 500 ML: 600; 310; 30; 20 INJECTION, SOLUTION INTRAVENOUS at 20:18

## 2024-02-08 RX ADMIN — FENTANYL CITRATE 100 MCG: 0.05 INJECTION, SOLUTION INTRAMUSCULAR; INTRAVENOUS at 17:25

## 2024-02-08 RX ADMIN — CHLOROPROCAINE HYDROCHLORIDE 5 ML: 30 INJECTION, SOLUTION EPIDURAL; INFILTRATION; INTRACAUDAL; PERINEURAL at 20:35

## 2024-02-08 RX ADMIN — FENTANYL CITRATE 50 MCG: 50 INJECTION, SOLUTION INTRAMUSCULAR; INTRAVENOUS at 21:31

## 2024-02-08 RX ADMIN — MISOPROSTOL 25 MCG: 100 TABLET ORAL at 06:08

## 2024-02-08 RX ADMIN — FENTANYL CITRATE 100 MCG: 0.05 INJECTION, SOLUTION INTRAMUSCULAR; INTRAVENOUS at 14:58

## 2024-02-08 RX ADMIN — OXYTOCIN-SODIUM CHLORIDE 0.9% IV SOLN 30 UNIT/500ML 10 ML/HR: 30-0.9/5 SOLUTION at 21:01

## 2024-02-08 RX ADMIN — MORPHINE SULFATE 2 MG: 1 INJECTION EPIDURAL; INTRATHECAL; INTRAVENOUS at 21:00

## 2024-02-08 RX ADMIN — FENTANYL CITRATE 100 MCG: 50 INJECTION INTRAMUSCULAR; INTRAVENOUS at 17:25

## 2024-02-08 RX ADMIN — SODIUM CHLORIDE, POTASSIUM CHLORIDE, SODIUM LACTATE AND CALCIUM CHLORIDE: 600; 310; 30; 20 INJECTION, SOLUTION INTRAVENOUS at 10:07

## 2024-02-08 RX ADMIN — FENTANYL CITRATE 50 MCG: 50 INJECTION, SOLUTION INTRAMUSCULAR; INTRAVENOUS at 21:33

## 2024-02-08 RX ADMIN — AZITHROMYCIN MONOHYDRATE 500 MG: 500 INJECTION, POWDER, LYOPHILIZED, FOR SOLUTION INTRAVENOUS at 20:25

## 2024-02-08 RX ADMIN — CHLOROPROCAINE HYDROCHLORIDE 5 ML: 30 INJECTION, SOLUTION EPIDURAL; INFILTRATION; INTRACAUDAL; PERINEURAL at 20:36

## 2024-02-08 RX ADMIN — Medication 2 MILLI-UNITS/MIN: at 10:09

## 2024-02-08 RX ADMIN — SODIUM CHLORIDE, POTASSIUM CHLORIDE, SODIUM LACTATE AND CALCIUM CHLORIDE: 600; 310; 30; 20 INJECTION, SOLUTION INTRAVENOUS at 14:32

## 2024-02-08 RX ADMIN — FENTANYL CITRATE 100 MCG: 50 INJECTION, SOLUTION INTRAMUSCULAR; INTRAVENOUS at 17:25

## 2024-02-08 RX ADMIN — ONDANSETRON 4 MG: 2 INJECTION INTRAMUSCULAR; INTRAVENOUS at 20:43

## 2024-02-08 RX ADMIN — FENTANYL CITRATE 100 MCG: 0.05 INJECTION, SOLUTION INTRAMUSCULAR; INTRAVENOUS at 13:41

## 2024-02-08 RX ADMIN — GENTAMICIN SULFATE 190 MG: 40 INJECTION, SOLUTION INTRAMUSCULAR; INTRAVENOUS at 21:03

## 2024-02-08 RX ADMIN — FENTANYL CITRATE 100 MCG: 0.05 INJECTION, SOLUTION INTRAMUSCULAR; INTRAVENOUS at 16:43

## 2024-02-08 RX ADMIN — LEVOTHYROXINE SODIUM 175 MCG: 0.17 TABLET ORAL at 08:21

## 2024-02-08 RX ADMIN — SODIUM CITRATE AND CITRIC ACID MONOHYDRATE 30 ML: 500; 334 SOLUTION ORAL at 20:21

## 2024-02-08 RX ADMIN — ACETAMINOPHEN 975 MG: 325 TABLET, FILM COATED ORAL at 20:19

## 2024-02-08 RX ADMIN — FENTANYL CITRATE 100 MCG: 50 INJECTION INTRAMUSCULAR; INTRAVENOUS at 20:40

## 2024-02-08 RX ADMIN — SODIUM CHLORIDE, POTASSIUM CHLORIDE, SODIUM LACTATE AND CALCIUM CHLORIDE: 600; 310; 30; 20 INJECTION, SOLUTION INTRAVENOUS at 17:56

## 2024-02-08 RX ADMIN — CLINDAMYCIN PHOSPHATE 900 MG: 900 INJECTION, SOLUTION INTRAVENOUS at 20:40

## 2024-02-08 RX ADMIN — DEXAMETHASONE SODIUM PHOSPHATE 4 MG: 4 INJECTION, SOLUTION INTRA-ARTICULAR; INTRALESIONAL; INTRAMUSCULAR; INTRAVENOUS; SOFT TISSUE at 20:43

## 2024-02-08 RX ADMIN — MISOPROSTOL 25 MCG: 100 TABLET ORAL at 03:20

## 2024-02-08 RX ADMIN — CHLOROPROCAINE HYDROCHLORIDE 5 ML: 30 INJECTION, SOLUTION EPIDURAL; INFILTRATION; INTRACAUDAL; PERINEURAL at 20:38

## 2024-02-08 RX ADMIN — SODIUM CHLORIDE, POTASSIUM CHLORIDE, SODIUM LACTATE AND CALCIUM CHLORIDE: 600; 310; 30; 20 INJECTION, SOLUTION INTRAVENOUS at 20:17

## 2024-02-08 RX ADMIN — PHENYLEPHRINE HYDROCHLORIDE 0.2 MCG/KG/MIN: 10 INJECTION INTRAVENOUS at 20:38

## 2024-02-08 RX ADMIN — CHLOROPROCAINE HYDROCHLORIDE 5 ML: 30 INJECTION, SOLUTION EPIDURAL; INFILTRATION; INTRACAUDAL; PERINEURAL at 20:33

## 2024-02-08 RX ADMIN — MISOPROSTOL 25 MCG: 100 TABLET ORAL at 01:16

## 2024-02-08 ASSESSMENT — ACTIVITIES OF DAILY LIVING (ADL)
ADLS_ACUITY_SCORE: 20

## 2024-02-08 NOTE — PROVIDER NOTIFICATION
02/08/24 1452   Provider Notification   Provider Name/Title Dr. Pringle   Method of Notification At Bedside   Request Evaluate in Person   Notification Reason SVE;Uterine Activity;Status Update      called to the bedside to evaluate as  is unavailable. FHR strip reviewed by Dr. Pringle from the bedside noting early decelerations and variables and PD noted. Patient verbalized strong urge to push. Pitocin remains off since last PD. IV fluid bolus infusing. SVE 8/90/0, same as nurse exam. Patient requesting another dose of IV fentanyl for pain relief.  stayed at bedside and reviewed FHR strip after reposition change. VORB by  to administer another dose of IV fentanyl since FHR has shown improvement with interventions. Contractions regular, strong with palpation. Patient needing coaching to breath through contractions. Using aromatherapy and music therapy currently.  left room after FHR improved and updated .

## 2024-02-08 NOTE — PROVIDER NOTIFICATION
02/08/24 1331   Provider Notification   Provider Name/Title DR. Rivas   Method of Notification At Bedside   Request Evaluate in Person   Notification Reason Uterine Activity;Pain;Status Update     MD at bedside, informed on patient pain status, Pt now requesting IV fentanyl. Nitrous Dcd. SVE 6/90/-1, FHR with intermittent, early and late. Patient on hands and knees. Agreeable to plan of care.

## 2024-02-08 NOTE — ANESTHESIA PROCEDURE NOTES
"Epidural catheter Procedure Note    Pre-Procedure   Staff -        Anesthesiologist:  Cornelio Cottrell MD       Performed By: anesthesiologist       Location: OB       Procedure Start/Stop Times: 2/8/2024 5:15 PM and 2/8/2024 5:34 PM       Pre-Anesthestic Checklist: patient identified, IV checked, risks and benefits discussed, informed consent, monitors and equipment checked and pre-op evaluation  Timeout:       Correct Patient: Yes        Correct Procedure: Yes        Correct Site: Yes        Correct Position: Yes   Procedure Documentation  Procedure: epidural catheter       Patient Position: sitting       Patient Prep/Sterile Barriers: sterile gloves, mask, patient draped       Skin prep: Betadine       Local skin infiltrated with 3 mL of 1% lidocaine.        Insertion Site: L3-4. (midline approach).       Technique: LORT saline        YSABEL at 8 cm.       Needle Type: Rockstar Solosy needle       Needle Gauge: 17.        Needle Length (Inches): 3.5        Catheter: 19 G.          Catheter threaded easily.         5 cm epidural space.         Threaded 13 cm at skin.         # of attempts: 1 and  # of redirects:  1    Assessment/Narrative         Paresthesias: No.       Test dose of 5 mL lidocaine 1.5% w/ 1:200,000 epinephrine at 17:25 CST.        .       Insertion/Infusion Method: LORT saline       Aspiration negative for Heme or CSF via Epidural Catheter.    Medication(s) Administered   0.125% Bupivacaine + 2 mcg/mL Fentanyl via CADD (Epidural) - EPIDURAL   10 mL - 2/8/2024 5:25:00 PM  Fentanyl PF (Epidural) - EPIDURAL   100 mcg - 2/8/2024 5:25:00 PM  Medication Administration Time: 2/8/2024 5:15 PM     Comments:  Periflex, lot [id number], exp. 2025-04-30      FOR [facility] ONLY:   Pain Team Contact information: please page the Pain Team Via [employer]. Search \"Pain\". During daytime hours, please page the attending first. At night please page the resident first.      "

## 2024-02-08 NOTE — PROVIDER NOTIFICATION
02/08/24 1520   Provider Notification   Provider Name/Title Dr. Rivas   Method of Notification At Bedside   Request Evaluate in Person   Notification Reason SVE;Decels      at the bedside now to evaluate.  was called to evaluate in person after another PD occurred and urge to push was verbalized from the patient since  was unavailable. SVE 8/90/0 by . Patient did receive 2nd dose of IV fentanyl per  approval and patient noted some relief. Patient currently in hands and knee position. Patient has been changing positions frequently and using peanut ball and birthing ball often. Last /83. SVE 7/90/0 per . FHR strip reviewed by  from the bedside. FHR showing intermittent PD, early and late decelerations.  discussed recommendations at this time: epidural placement now to help with relaxation, continue with more frequent position changes and may administer another dose of IV fentanyl if FHR is not category 2 tracing. Patient is electing to continue with frequent position changes for now and declining epidural placement at this time.  currently left L&D and returned to the clinic, will be off call at 1700 and  will be on call after 1700.  will update  with updates. POC discussed with patient and FOB.

## 2024-02-08 NOTE — PROGRESS NOTES
OB Progress Note    S:  Breathing through ctx, feeling better after 2nd dose of Fentanyl. Declines epidural at this time      O:  BP (!) 141/83   Temp 97.9  F (36.6  C) (Oral)   Resp 20   EFM: baseline 110, accelerations absent, early and prolonged decelerations, moderate variability; Category II  [place]:  Ctx q2-5 min  SVE:  7/90/0  Membranes: AROM 8:28am, clear fluid    Labs:   Hgb 12.6  Plt 220  Cr 0/76  AST 38  ALT 13  Urine protein: to low to calculate      A/P:  23 year old  @41w0d admitted for IOL secondary to post-term pregnancy    1.  Routine cares  2.  IOL: Pitocin now off due to deceleration(early and prolonged). I encouraged pt to get epidural at this time as she is still 7cm, pt declines at this time. Will monitor closely and recheck in 1 hour or prn  3.  Elevated BP's: Mildly elevated BPs, 2+ LE edema over past 2 weeks, normal preE labs, urine negative      Emily Rivas MD

## 2024-02-08 NOTE — PROVIDER NOTIFICATION
02/08/24 0828   Provider Notification   Provider Name/Title Dr. Rivas   Method of Notification At Bedside   Request Evaluate in Person   Notification Reason SVE     MD at bedside, reviewed the FHR and contraction pattern. FHR cat 1, Pt comfortable and relaxed. Discussed with patient on plan of care. Patient consents to AROM and pitocin. AROM for clear fluid. SVE 3/80/-2. Plan; pitocin to be started in one hour, orders in place. Pt birth plan reviewed.

## 2024-02-08 NOTE — PROVIDER NOTIFICATION
02/08/24 1217   Provider Notification   Provider Name/Title DR. Rivas   Method of Notification Phone   Request Evaluate - Remote   Notification Reason Labor Status;Uterine Activity;Pain;SVE;Status Update;Patient Request;Decels     Dr. Dunbar updated on pt status. Pitocin at 4 milliunits/ min, Contraction every 1.5-3.5, FHR intermittent, early variables. SVE 4/80/-1. Pt reports increased pain and requested Nitrous. MD will reassess the patient at around 1330.  POC discussed with patient and .

## 2024-02-08 NOTE — PROVIDER NOTIFICATION
02/07/24 2016   Provider Notification   Provider Name/Title Dr. Garcia   Method of Notification Phone   Request Evaluate - Remote   Notification Reason Patient Arrived;SVE     Dr. Garcia notified of patient arrival for post dates induction. SVE 3/50%/-3 and intact, Yan score of 5. FHR Category 1, renuka every 5-6 minutes, palpates mild. Notified of recent increase in swelling to lower extremities, hands and face along with numbness and tingling in hands and feet over the last three weeks. Blood pressure on admission 136/86, denies s/s of pre-eclampsia, reflexes and clonus WNL.    TORB for standard intrapartum orders, draw and hold green tube at this time, start P.O. Cytotec overnight for cervical ripening, 50-100mg Vistaril for sleep.

## 2024-02-08 NOTE — ANESTHESIA PREPROCEDURE EVALUATION
Anesthesia Pre-Procedure Evaluation    Patient: Halie Rogel   MRN: [id number] : 2000        Procedure : * No procedures listed *          History reviewed. No pertinent past medical history.   Past Surgical History:   Procedure Laterality Date    WISDOM TOOTH REMOVAL        Allergies   Allergen Reactions    Amoxicillin     Pcn [Penicillins]       Social History     Tobacco Use    Smoking status: Never    Smokeless tobacco: Not on file   Substance Use Topics    Alcohol use: No      Wt Readings from Last 1 Encounters:   22 70.3 kg (155 lb)        Anesthesia Evaluation   Pt has had prior anesthetic. Type: General.    No history of anesthetic complications       ROS/MED HX  ENT/Pulmonary:  - neg pulmonary ROS     Neurologic:  - neg neurologic ROS     Cardiovascular:  - neg cardiovascular ROS     METS/Exercise Tolerance:     Hematologic:  - neg hematologic  ROS     Musculoskeletal:  - neg musculoskeletal ROS     GI/Hepatic:  - neg GI/hepatic ROS     Renal/Genitourinary:  - neg Renal ROS     Endo:  - neg endo ROS     Psychiatric/Substance Use:  - neg psychiatric ROS     Infectious Disease:  - neg infectious disease ROS     Malignancy:  - neg malignancy ROS     Other:      (+) Possibly pregnant, , ,         Physical Exam    Airway         TM distance: > 3 FB   Neck ROM: full   Mouth opening: > 3 cm    Respiratory Devices and Support         Dental           Cardiovascular             Pulmonary                   OUTSIDE LABS:  CBC:   Lab Results   Component Value Date    WBC 10.3 2024    WBC 6.6 2023    HGB 12.6 2024    HGB 13.1 2023    HCT 38.3 2024    HCT 40.3 2023     2024     2023     BMP:   Lab Results   Component Value Date     2024     2019    POTASSIUM 4.4 2024    POTASSIUM 3.8 2019    CHLORIDE 102 2024    CHLORIDE 108 2019    CO2 20 (L) 2024    CO2 27 2019    BUN 10.7  "02/07/2024    BUN 7 07/04/2019    CR 0.76 02/07/2024    CR 0.67 07/04/2019    GLC 82 02/07/2024    GLC 86 07/04/2019     COAGS: No results found for: \"PTT\", \"INR\", \"FIBR\"  POC: No results found for: \"BGM\", \"HCG\", \"HCGS\"  HEPATIC:   Lab Results   Component Value Date    ALBUMIN 3.3 (L) 02/07/2024    PROTTOTAL 6.5 02/07/2024    ALT 13 02/07/2024    AST 38 02/07/2024    ALKPHOS 146 02/07/2024    BILITOTAL 0.2 02/07/2024     OTHER:   Lab Results   Component Value Date    LILIANA 9.2 02/07/2024    LIPASE 149 07/04/2019    TSH 3.54 08/02/2023    T4 1.66 08/02/2023       Anesthesia Plan    ASA Status:  2, emergent       Anesthesia Type: Epidural.              Consents    Anesthesia Plan(s) and associated risks, benefits, and realistic alternatives discussed. Questions answered and patient/representative(s) expressed understanding.     - Discussed:     - Discussed with:  Patient            Postoperative Care            Comments:    Other Comments: Urgent CS called by OBGYN. Epidural running for labor analgesia. Patient ate a granola bar 4 hours ago. Discussed with the patient and her spouse the risks/benefits of anesthesia, including increased risk of aspiration if general anesthesia is indicated; however, patient agreed to proceed given urgent need for procedure. All questions answered.    Plan: surgical dose epidural        neg OB ROS.      Cornelio Cottrell MD    I have reviewed the pertinent notes and labs in the chart from the past 30 days and (re)examined the patient.  Any updates or changes from those notes are reflected in this note.     # Hyponatremia: Lowest Na = 135 mmol/L in last 30 days, will monitor as appropriate      # Hypoalbuminemia: Lowest albumin = 3.3 g/dL at 2/7/2024 11:18 PM, will monitor as appropriate         "

## 2024-02-08 NOTE — PROVIDER NOTIFICATION
02/08/24 0743   Provider Notification   Provider Name/Title    Method of Notification Phone   Request Evaluate - Remote   Notification Reason Uterine Activity;Labor Status;Pain;Patient Request;Status Update      called with medication request and update. Patient requesting 175 mcg levothyroxine PO this morning, TORB by  for medication. FHR category 1 tracing with moderate variability and accelerations and no decelerations. Irregular contractions, 2-5 min apart, patient reporting cramping and coping. Patient received 4th dose of oral Cytotec at 0608. Mild blood pressure noted this morning, 143/87, repeat 129/82 . Asymptomatic symptoms. Additional green tube was collected and held in lab, do you want pre-eclampsia labs drawn? TORB by  for pre-eclampsia labs and patient may take shower now if desires.  plans to evaluate patient around 0830 and anticipate AROM and starting pitocin. POC discussed with patient and FOB.

## 2024-02-08 NOTE — PROVIDER NOTIFICATION
02/08/24 1645   Provider Notification   Provider Name/Title Dr. Rivas   Method of Notification At Bedside   Request Evaluate in Person     MD at bedside, reviewed the FHR, and patient pain status, third dose of fentanyl administered per pt request. Pt was in throne position and using peanut ball. Pt requested epidural. MD rivas transfering care to MD Ricci at 1700.

## 2024-02-08 NOTE — PROGRESS NOTES
OB Progress Note  2024  8:39 AM    S:  Feeling mild tightening, no painful ctx. Last cytotec at 6am. Showered and eating breakfast.       O:  /82   Temp 97.7  F (36.5  C) (Oral)   Resp 18   EFM: baseline 120, accelerations present, absent decelerations, moderate variability; Category I  [place]:  Ctx q2-5 min  SVE:  3/80%/-2  Membranes: AROM 8:28am, clear fluid    Labs:   Hgb 12.6  Plt 220  Cr 0/76  AST 38  ALT 13      A/P:  23 year old  @41w0d admitted for IOL secondary to post-term pregnancy    1.  Routine cares  2.  IOL: S/p 4 doses of cytotec overnight, cervix now favorable. AROM now, will start Pitocin in 1 hour if ctx not more painful given ctx q2-5min. Birth plan reviewed. Prefers no epidural  3.  Elevated BP's: Mildly elevated BPs, 2+ LE edema over past 2 weeks, normal preE labs, urine pending    Emily Rivas MD

## 2024-02-08 NOTE — DISCHARGE INSTRUCTIONS
Warning Signs after Having a Baby    Keep this paper on your fridge or somewhere else where you can see it.    Call your provider if you have any of these symptoms up to 12 weeks after having your baby.    Thoughts of hurting yourself or your baby  Pain in your chest or trouble breathing  Severe headache not helped by pain medicine  Eyesight concerns (blurry vision, seeing spots or flashes of light, other changes to eyesight)  Fainting, shaking or other signs of a seizure    Call 9-1-1 if you feel that it is an emergency.     The symptoms below can happen to anyone after giving birth. They can be very serious. Call your provider if you have any of these warning signs.    My provider s phone number: _______________________    Losing too much blood (hemorrhage)    Call your provider if you soak through a pad in less than an hour or pass blood clots bigger than a golf ball. These may be signs that you are bleeding too much.    Blood clots in the legs or lungs    After you give birth, your body naturally clots its blood to help prevent blood loss. Sometimes this increased clotting can happen in other areas of the body, like the legs or lungs. This can block your blood flow and be very dangerous.     Call your provider if you:  Have a red, swollen spot on the back of your leg that is warm or painful when you touch it.   Are coughing up blood.     Infection    Call your provider if you have any of these symptoms:  Fever of 100.4 F (38 C) or higher.  Pain or redness around your stitches if you had an incision.   Any yellow, white, or green fluid coming from places where you had stitches or surgery.    Mood Problems (postpartum depression)    Many people feel sad or have mood changes after having a baby. But for some people, these mood swings are worse.     Call your provider right away if you feel so anxious or nervous that you can't care for yourself or your baby.    Preeclampsia (high blood pressure)    Even if you  didn't have high blood pressure when you were pregnant, you are at risk for the high blood pressure disease called preeclampsia. This risk can last up to 12 weeks after giving birth.     Call your provider if you have:   Pain on your right side under your rib cage  Sudden swelling in the hands and face    Remember: You know your body. If something doesn't feel right, get medical help.     For informational purposes only. Not to replace the advice of your health care provider. Copyright 2020 [facility]. All rights reserved. Clinically reviewed by Hailey Hutchins, RNC-OB, MSN. [facility] 983416 - Rev 02/23.  Checking Your Blood Pressure at Home  During and after pregnancy  How do I measure my blood pressure?  It s important to take the readings at the same time each day, such as morning and evening. Take your blood pressure before taking any morning medications.  How to get the most accurate reading  30 minutes before checking your blood pressure, avoid the following:  Drinking caffeine  Drinking alcohol  Eating  Smoking  Exercising  5 minutes before checking your blood pressure:  Use the bathroom and urinate so you have an empty bladder.  Sit still in a chair for around 5 minutes. Stay calm and relaxed and do not talk if possible.  To check your blood pressure:     Sit up straight in a chair.  Place your feet on the floor. Don t cross your ankles or legs.  Rest your arm at the level of your heart on a table or desk or on the arm of a chair. Use the same arm every day.  Pull up your shirt sleeve. Don t take the measurement over clothes.  Wrap the blood pressure cuff around the upper part of your left arm, 1 inch (2.5 cm) above your elbow.  Fit the cuff snugly around your arm. You should be able to place only one finger between the cuff and your arm.  Position the cord so that it rests in the bend of your elbow.  Press the power button.  Sit quietly while the cuff inflates and deflates.  Read the digital  reading on the monitor screen and write the numbers down (record them) in a notebook.  Wait 2-3 minutes, then repeat the steps, starting at step 1.  Which features do you need?  Arm cuff monitors give the most exact readings.  Wrist and finger blood pressure monitors are often less exact.  Pick a blood pressure monitor that has passed tests to show they measure exactly. Blood pressure cuffs for sale in the [place] that have passed tests are listed on the website www.validatebp.org.  Some monitors that have passed tests are:  Omron 3 Series Upper Arm Blood Pressure Monitor (Model MR8439)  Omron 5 Series Upper Arm Blood Pressure Monitor (Model [id number])  Omron 7 Series Upper Arm Blood Pressure Monitor (Model HEM-7320)  [employer] Upper Arm Blood Pressure Monitor with Talking Function (UA 1030T)  Don t use smartphone apps. There are many smartphone apps that claim to check your blood pressure using the pulse in your wrist or finger. These don t work. They haven t passed any tests. Don t give your clinic a blood pressure reading from a smartphone kamlesh.  If you have a flexible spending account (FSA) or health savings account (HSA), you may wish to pay yourself back (reimburse) for the machine and cuff. A blood pressure monitor is an allowed over-the- counter (OTC) item to pay yourself back from these accounts.  Cuff size  The size of the arm cuff is a key feature. Make sure the cuff is the right size for your arm. If the cuff isn t the right size, readings will either be too high or low.  To know what size cuff to buy, measure the distance around your bicep (upper arm).  Use a flexible measuring tape or . Place the measuring tape custodial between your armpit and elbow. Measure the distance around your arm in inches.  You may need to buy a cuff apart from the machine to get the right size.  Cuff sizes and arm measurements  Small adult: 22 to 26 cm (8.7 to 10.2 inches)  Adult: 27 to 34 cm (10.6 to 13.4 inches)  Large  "adult: 35 to 44 cm (13.8 to 17.3 inches)  Adult thigh: 45 to 52 cm (17.7 to 20.5 inches)    Copyright statement\" content=\"For informational purposes only. Not to replace the advice of your health care provider. Photo: ID [id number]   [employer]. Text copyright   2023 [facility]. All rights reserved. Clinically reviewed by [facility]. [employer] [id number] - REV 03/23.    Know Your Blood Pressure Numbers  For patients who've had a high blood pressure disorder of pregnancy  What to know about high blood pressure disorders of pregnancy  People who had high blood pressure during pregnancy may continue to have high blood pressure for up to 12 weeks after pregnancy. It can also raise your lifetime risk of chronic high blood pressure, heart disease and blood vessel disease. It is vital that you keep monitoring your blood pressure and taking steps to control it.   The general guidelines below are what your blood pressures mean.  A heart healthy lifestyle that includes blood pressure control can help reduce these risks. A good blood pressure goal is less than 130/80 mmHg long-term.  Talk to your provider about high blood pressure disorder of pregnancy and what this means for your lifelong health.   Know your numbers  Read \"Checking Your Blood Pressure at Home\" to learn the best way to take your blood pressure.  Refer to the next page for general guidelines about what your blood pressures mean and what to do about them. Follow these instructions unless your provider tells you something different.  For more resources, visit www.preeclampsia.org.  What to do if your blood pressure is high  Act right away if you have numbers in the yellow or red range--don't wait for a scheduled appointment.  When to call your provider  Regardless of your blood pressure, call your healthcare provider right away if you develop any of these symptoms:  Severe headache  Chest pain  Trouble " "breathing  Stomach pain  Changes in vision  Swelling in your hands and face.  Please say, \"I am having symptoms of high blood pressure. My provider told me to call and ask to be seen right away when I have these symptoms.\"  If you ARE pregnant OR   it has been less than 12 weeks since you delivered  Systolic pressure (top number) is....  Diastolic (bottom number) is.... Your blood pressure is....   160 or higher  or 110 or higher VERY HIGH. Check it again in 10 minutes, then contact your provider.   140 - 159  or 90 - 109 HIGH. Keep checking blood pressure 2 times a day. If your blood pressure is in this range for 2 readings, contact your provider within 24 hours. We will discuss starting or increasing your blood pressure medicine.   100 - 139  and 60 - 89 NORMAL. Your blood pressure looks great!   Keep checking it 2 times a day.   Less than 100  or Less than 60 LOW. Check your blood pressure again in   10 minutes, then contact your provider. We may need to make changes to your blood pressure medicine.     Call your provider right away if you have these symptoms: a severe headache, vision changes, shortness of breath, chest pain, or right upper belly pain. Call even if your blood pressure is okay.  Call 9-1-1 if you feel the symptoms are severe and that it is an emergency.   If you are NOT pregnant OR   it has been more than 12 weeks since you delivered  Systolic pressure (top number) is....  Diastolic (bottom number) is.... Your blood pressure is....   Higher than 180 and/or Higher than 120 Hypertensive crisis. Call your doctor right away.   140 or higher or  90 or higher Hypertension Stage 2. Follow up with your provider.   130-139 or Less than 80 Hypertension Stage 1. Follow up with your provider.   120-129 and Less than 80 Elevated blood pressure (pre-hypertension). You are at higher risk of developing high blood pressure. Follow up with your provider.   Less than 120 and Less than 80 Normal.     Call your " provider right away if you have these symptoms: a severe headache, vision changes, shortness of breath, chest pain, or right upper belly pain. Call even if your blood pressure is okay.  Call  if you feel the symptoms are severe and that it is an emergency.   For informational purposes only. Not to replace the advice of your health care provider. Copyright    [facility]. All rights reserved. Clinically reviewed by [facility]. [employer] [id number] - .     Section: What to Expect at Home  Your Recovery     A  section, or , is surgery to deliver your baby through a cut that the doctor makes in your lower belly and uterus. The cut is called an incision.  You may have some pain in your lower belly and need pain medicine for 1 to 2 weeks. You can expect some vaginal bleeding for several weeks. You will probably need about 6 weeks to fully recover.  It's important to take it easy while the incision heals. Avoid heavy lifting, strenuous activities, and exercises that strain the belly muscles while you recover. Ask a family member or friend for help with housework, cooking, and shopping.  This care sheet gives you a general idea about how long it will take for you to recover. But each person recovers at a different pace. Follow the steps below to get better as quickly as possible.  How can you care for yourself at home?  Activity    Rest when you feel tired. Getting enough sleep will help you recover.     Try to walk each day. Start by walking a little more than you did the day before. Bit by bit, increase the amount you walk. Walking boosts blood flow and helps prevent pneumonia, constipation, and blood clots.     Avoid strenuous activities, such as bicycle riding, jogging, weightlifting, and aerobic exercise, for 6 weeks or until your doctor says it is okay.     Until your doctor says it is okay, do not lift anything heavier than your baby.     Do not do  sit-ups or other exercises that strain the belly muscles for 6 weeks or until your doctor says it is okay.     Hold a pillow over your incision when you cough or take deep breaths. This will support your belly and decrease your pain.     You may shower as usual. Pat the incision dry when you are done.     You will have some vaginal bleeding. Wear sanitary pads. Do not douche or use tampons until your doctor says it is okay.     Ask your doctor when you can drive again.     You will probably need to take at least 6 weeks off work. It depends on the type of work you do and how you feel.     Ask your doctor when it is okay for you to have sex.   Diet    You can eat your normal diet. If your stomach is upset, try bland, low-fat foods like plain rice, broiled chicken, toast, and yogurt.     Drink plenty of fluids (unless your doctor tells you not to).     You may notice that your bowel movements are not regular right after your surgery. This is common. Try to avoid constipation and straining with bowel movements. You may want to take a fiber supplement every day. If you have not had a bowel movement after a couple of days, ask your doctor about taking a mild laxative.     If you are breastfeeding, limit alcohol. Alcohol can cause a lack of energy and other health problems for the baby when a breastfeeding woman drinks heavily. It can also get in the way of a mom's ability to feed her baby or to care for the child in other ways. There isn't a lot of research about exactly how much alcohol can harm a baby. Having no alcohol is the safest choice for your baby. If you choose to have a drink now and then, have only one drink, and limit the number of occasions that you have a drink. Wait to breastfeed at least 2 hours after you have a drink to reduce the amount of alcohol the baby may get in the milk.   Medicines    Your doctor will tell you if and when you can restart your medicines. You will also get instructions about  taking any new medicines.     If you stopped taking aspirin or some other blood thinner, your doctor will tell you when to start taking it again.     Take pain medicines exactly as directed.  If the doctor gave you a prescription medicine for pain, take it as prescribed.  If you are not taking a prescription pain medicine, ask your doctor if you can take an over-the-counter medicine.     If you think your pain medicine is making you sick to your stomach:  Take your medicine after meals (unless your doctor has told you not to).  Ask your doctor for a different pain medicine.     If your doctor prescribed antibiotics, take them as directed. Do not stop taking them just because you feel better. You need to take the full course of antibiotics.   Incision care    If you have strips of tape on the incision, leave the tape on for a week or until it falls off.     Wash the area daily with warm, soapy water, and pat it dry. Don't use hydrogen peroxide or alcohol, which can slow healing. You may cover the area with a gauze bandage if it weeps or rubs against clothing. Change the bandage every day.     Keep the area clean and dry.   Other instructions    If you breastfeed your baby, you may be more comfortable while you are healing if you don't rest your baby on your belly. Try tucking your baby under your arm, with your baby's body along the side you will be feeding on. Support your baby's upper body with your arm. With that hand you can control your baby's head to bring your baby's mouth to your breast. This is sometimes called the football hold.   Follow-up care is a key part of your treatment and safety. Be sure to make and go to all appointments, and call your doctor if you are having problems. It's also a good idea to know your test results and keep a list of the medicines you take.  When should you call for help?  Share this information with your partner, family, or a friend. They can help you watch for warning  signs.  Call 911  anytime you think you may need emergency care. For example, call if:    You have thoughts of harming yourself, your baby, or another person.     You passed out (lost consciousness).     You have chest pain, are short of breath, or cough up blood.     You have a seizure.   Where to get help 24 hours a day, 7 days a week   If you or someone you know talks about suicide, self-harm, a mental health crisis, a substance use crisis, or any other kind of emotional distress, get help right away. You can:    Call the Suicide and Crisis Lifeline at 988.     Call 1-244-353-TALK (1-240.124.8673).     Text HOME to 544358 to access the Crisis Text Line.   Consider saving these numbers in your phone.  Go to [employer] for more information or to chat online.  Call your doctor now or seek immediate medical care if:    You have loose stitches, or your incision comes open.     You have signs of hemorrhage (too much bleeding), such as:  Heavy vaginal bleeding. This means that you are soaking through one or more pads in an hour. Or you pass blood clots bigger than an egg.  Feeling dizzy or lightheaded, or you feel like you may faint.  Feeling so tired or weak that you cannot do your usual activities.  A fast or irregular heartbeat.  New or worse belly pain.     You have symptoms of infection, such as:  Increased pain, swelling, warmth, or redness.  Red streaks leading from the incision.  Pus draining from the incision.  A fever.  Frequent or painful urination or blood in your urine.  Vaginal discharge that smells bad.  New or worse belly pain.     You have symptoms of a blood clot in your leg (called a deep vein thrombosis), such as:  Pain in the calf, back of the knee, thigh, or groin.  Swelling in the leg or groin.  A color change on the leg or groin. The skin may be reddish or purplish, depending on your usual skin color.     You have signs of preeclampsia, such as:  Sudden swelling of your face, hands, or  "feet.  New vision problems (such as dimness, blurring, or seeing spots).  A severe headache.     You have signs of heart failure, such as:  New or increased shortness of breath.  New or worse swelling in your legs, ankles, or feet.  Sudden weight gain, such as more than 2 to 3 pounds in a day or 5 pounds in a week.  Feeling so tired or weak that you cannot do your usual activities.     You had spinal or epidural pain relief and have:  New or worse back pain.  Increased pain, swelling, warmth, or redness at the injection site.  Tingling, weakness, or numbness in your legs or groin.   Watch closely for changes in your health, and be sure to contact your doctor if:    Your vaginal bleeding isn't decreasing.     You feel sad, anxious, or hopeless for more than a few days.     You are having problems with your breasts or breastfeeding.   Where can you learn more?  Go to https://www.[employer].net/patiented  Enter M806 in the search box to learn more about \" Section: What to Expect at Home.\"  Current as of: 2023               Content Version: 13.8    3926-6242 [employer].   Care instructions adapted under license by your healthcare professional. If you have questions about a medical condition or this instruction, always ask your healthcare professional. [employer] disclaims any warranty or liability for your use of this information.      "

## 2024-02-08 NOTE — H&P
OB Brief Admit H&P    No significant change in general health status based on examination of the patient, review of Nursing Admission Database and prenatal record.    Pt is a 23 year old  @ Unknown who presented to L&D for IOL .    Patient's prenatal course has been complicated by hypothyroidism, transferred care to [place] at 18 weeks then back at 35 wks    Prenatal Labs:    Blood type O+  Rubella imm  GCTneg  GBS neg    EFW: 8.5lbs    /69 (BP Location: Left arm, Patient Position: Semi-Sebastian's, Cuff Size: Adult Regular)   Temp 97.5  F (36.4  C) (Oral)   Resp 16   EFM:  cat 1  [place]: none  SVE: 2/50%/-3 posterior  Membranes:  intact    ASSESSMENT/PLAN:  Halie Rogel  is a 23 year old   41+1 by LMP c/w first tri US who presents for iol.  1.  Admit to L&D  2. Labor Induction after cervical ripening overnight  3.  Fetal status is Category 1 with accelerations  4.  Pain management: prn  5.  GBS neg  6. Anticipate .     Jimmie Garcia DO  Dept of OB/GYN  2024

## 2024-02-09 LAB — HGB BLD-MCNC: 8.8 G/DL (ref 11.7–15.7)

## 2024-02-09 PROCEDURE — 250N000013 HC RX MED GY IP 250 OP 250 PS 637: Performed by: OBSTETRICS & GYNECOLOGY

## 2024-02-09 PROCEDURE — 85018 HEMOGLOBIN: CPT | Performed by: OBSTETRICS & GYNECOLOGY

## 2024-02-09 PROCEDURE — 999N000080 HC STATISTIC IP LACTATION SERVICES 16-30 MIN

## 2024-02-09 PROCEDURE — 120N000001 HC R&B MED SURG/OB

## 2024-02-09 PROCEDURE — 250N000011 HC RX IP 250 OP 636: Performed by: OBSTETRICS & GYNECOLOGY

## 2024-02-09 PROCEDURE — 36415 COLL VENOUS BLD VENIPUNCTURE: CPT | Performed by: OBSTETRICS & GYNECOLOGY

## 2024-02-09 RX ORDER — NALOXONE HYDROCHLORIDE 0.4 MG/ML
0.2 INJECTION, SOLUTION INTRAMUSCULAR; INTRAVENOUS; SUBCUTANEOUS
Status: DISCONTINUED | OUTPATIENT
Start: 2024-02-09 | End: 2024-02-12 | Stop reason: HOSPADM

## 2024-02-09 RX ORDER — LOPERAMIDE HCL 2 MG
2 CAPSULE ORAL
Status: DISCONTINUED | OUTPATIENT
Start: 2024-02-09 | End: 2024-02-12 | Stop reason: HOSPADM

## 2024-02-09 RX ORDER — METHYLERGONOVINE MALEATE 0.2 MG/ML
200 INJECTION INTRAVENOUS
Status: DISCONTINUED | OUTPATIENT
Start: 2024-02-09 | End: 2024-02-12 | Stop reason: HOSPADM

## 2024-02-09 RX ORDER — OXYTOCIN 10 [USP'U]/ML
10 INJECTION, SOLUTION INTRAMUSCULAR; INTRAVENOUS
Status: DISCONTINUED | OUTPATIENT
Start: 2024-02-09 | End: 2024-02-12 | Stop reason: HOSPADM

## 2024-02-09 RX ORDER — OXYTOCIN/0.9 % SODIUM CHLORIDE 30/500 ML
340 PLASTIC BAG, INJECTION (ML) INTRAVENOUS CONTINUOUS PRN
Status: DISCONTINUED | OUTPATIENT
Start: 2024-02-09 | End: 2024-02-12 | Stop reason: HOSPADM

## 2024-02-09 RX ORDER — TRANEXAMIC ACID 10 MG/ML
1 INJECTION, SOLUTION INTRAVENOUS EVERY 30 MIN PRN
Status: DISCONTINUED | OUTPATIENT
Start: 2024-02-09 | End: 2024-02-12 | Stop reason: HOSPADM

## 2024-02-09 RX ORDER — MISOPROSTOL 200 UG/1
800 TABLET ORAL
Status: DISCONTINUED | OUTPATIENT
Start: 2024-02-09 | End: 2024-02-12 | Stop reason: HOSPADM

## 2024-02-09 RX ORDER — AMOXICILLIN 250 MG
2 CAPSULE ORAL 2 TIMES DAILY
Status: DISCONTINUED | OUTPATIENT
Start: 2024-02-09 | End: 2024-02-12 | Stop reason: HOSPADM

## 2024-02-09 RX ORDER — CARBOPROST TROMETHAMINE 250 UG/ML
250 INJECTION, SOLUTION INTRAMUSCULAR
Status: DISCONTINUED | OUTPATIENT
Start: 2024-02-09 | End: 2024-02-12 | Stop reason: HOSPADM

## 2024-02-09 RX ORDER — NALOXONE HYDROCHLORIDE 0.4 MG/ML
0.4 INJECTION, SOLUTION INTRAMUSCULAR; INTRAVENOUS; SUBCUTANEOUS
Status: DISCONTINUED | OUTPATIENT
Start: 2024-02-09 | End: 2024-02-12 | Stop reason: HOSPADM

## 2024-02-09 RX ORDER — MISOPROSTOL 200 UG/1
400 TABLET ORAL
Status: DISCONTINUED | OUTPATIENT
Start: 2024-02-09 | End: 2024-02-12 | Stop reason: HOSPADM

## 2024-02-09 RX ORDER — PRENATAL VIT/IRON FUM/FOLIC AC 27MG-0.8MG
1 TABLET ORAL DAILY
Status: DISCONTINUED | OUTPATIENT
Start: 2024-02-09 | End: 2024-02-12 | Stop reason: HOSPADM

## 2024-02-09 RX ORDER — SIMETHICONE 80 MG
80 TABLET,CHEWABLE ORAL 4 TIMES DAILY PRN
Status: DISCONTINUED | OUTPATIENT
Start: 2024-02-09 | End: 2024-02-12 | Stop reason: HOSPADM

## 2024-02-09 RX ORDER — OXYCODONE HYDROCHLORIDE 5 MG/1
5 TABLET ORAL EVERY 4 HOURS PRN
Status: DISCONTINUED | OUTPATIENT
Start: 2024-02-09 | End: 2024-02-12 | Stop reason: HOSPADM

## 2024-02-09 RX ORDER — AMOXICILLIN 250 MG
1 CAPSULE ORAL 2 TIMES DAILY
Status: DISCONTINUED | OUTPATIENT
Start: 2024-02-09 | End: 2024-02-12 | Stop reason: HOSPADM

## 2024-02-09 RX ORDER — KETOROLAC TROMETHAMINE 30 MG/ML
30 INJECTION, SOLUTION INTRAMUSCULAR; INTRAVENOUS EVERY 6 HOURS
Status: COMPLETED | OUTPATIENT
Start: 2024-02-09 | End: 2024-02-09

## 2024-02-09 RX ORDER — ACETAMINOPHEN 325 MG/1
975 TABLET ORAL EVERY 6 HOURS
Status: DISCONTINUED | OUTPATIENT
Start: 2024-02-09 | End: 2024-02-12 | Stop reason: HOSPADM

## 2024-02-09 RX ORDER — DEXTROSE, SODIUM CHLORIDE, SODIUM LACTATE, POTASSIUM CHLORIDE, AND CALCIUM CHLORIDE 5; .6; .31; .03; .02 G/100ML; G/100ML; G/100ML; G/100ML; G/100ML
INJECTION, SOLUTION INTRAVENOUS CONTINUOUS
Status: DISCONTINUED | OUTPATIENT
Start: 2024-02-09 | End: 2024-02-12 | Stop reason: HOSPADM

## 2024-02-09 RX ORDER — PROCHLORPERAZINE MALEATE 10 MG
10 TABLET ORAL EVERY 6 HOURS PRN
Status: DISCONTINUED | OUTPATIENT
Start: 2024-02-09 | End: 2024-02-12 | Stop reason: HOSPADM

## 2024-02-09 RX ORDER — ONDANSETRON 2 MG/ML
4 INJECTION INTRAMUSCULAR; INTRAVENOUS EVERY 6 HOURS PRN
Status: DISCONTINUED | OUTPATIENT
Start: 2024-02-09 | End: 2024-02-12 | Stop reason: HOSPADM

## 2024-02-09 RX ORDER — MODIFIED LANOLIN
OINTMENT (GRAM) TOPICAL
Status: DISCONTINUED | OUTPATIENT
Start: 2024-02-09 | End: 2024-02-12 | Stop reason: HOSPADM

## 2024-02-09 RX ORDER — PROCHLORPERAZINE 25 MG
25 SUPPOSITORY, RECTAL RECTAL EVERY 12 HOURS PRN
Status: DISCONTINUED | OUTPATIENT
Start: 2024-02-09 | End: 2024-02-12 | Stop reason: HOSPADM

## 2024-02-09 RX ORDER — METOCLOPRAMIDE 10 MG/1
10 TABLET ORAL EVERY 6 HOURS PRN
Status: DISCONTINUED | OUTPATIENT
Start: 2024-02-09 | End: 2024-02-12 | Stop reason: HOSPADM

## 2024-02-09 RX ORDER — LIDOCAINE 40 MG/G
CREAM TOPICAL
Status: DISCONTINUED | OUTPATIENT
Start: 2024-02-09 | End: 2024-02-12 | Stop reason: HOSPADM

## 2024-02-09 RX ORDER — HYDROMORPHONE HCL IN WATER/PF 6 MG/30 ML
0.2 PATIENT CONTROLLED ANALGESIA SYRINGE INTRAVENOUS
Status: DISCONTINUED | OUTPATIENT
Start: 2024-02-09 | End: 2024-02-12 | Stop reason: HOSPADM

## 2024-02-09 RX ORDER — GUAIFENESIN AND DEXTROMETHORPHAN HYDROBROMIDE 600; 30 MG/1; MG/1
1 TABLET, EXTENDED RELEASE ORAL 2 TIMES DAILY PRN
Status: DISCONTINUED | OUTPATIENT
Start: 2024-02-09 | End: 2024-02-12 | Stop reason: HOSPADM

## 2024-02-09 RX ORDER — BISACODYL 10 MG
10 SUPPOSITORY, RECTAL RECTAL DAILY PRN
Status: DISCONTINUED | OUTPATIENT
Start: 2024-02-10 | End: 2024-02-12 | Stop reason: HOSPADM

## 2024-02-09 RX ORDER — METOCLOPRAMIDE HYDROCHLORIDE 5 MG/ML
10 INJECTION INTRAMUSCULAR; INTRAVENOUS EVERY 6 HOURS PRN
Status: DISCONTINUED | OUTPATIENT
Start: 2024-02-09 | End: 2024-02-12 | Stop reason: HOSPADM

## 2024-02-09 RX ORDER — HYDROCORTISONE 25 MG/G
CREAM TOPICAL 3 TIMES DAILY PRN
Status: DISCONTINUED | OUTPATIENT
Start: 2024-02-09 | End: 2024-02-12 | Stop reason: HOSPADM

## 2024-02-09 RX ORDER — LOPERAMIDE HCL 2 MG
4 CAPSULE ORAL
Status: DISCONTINUED | OUTPATIENT
Start: 2024-02-09 | End: 2024-02-12 | Stop reason: HOSPADM

## 2024-02-09 RX ORDER — IBUPROFEN 800 MG/1
800 TABLET, FILM COATED ORAL EVERY 6 HOURS
Status: DISCONTINUED | OUTPATIENT
Start: 2024-02-09 | End: 2024-02-12 | Stop reason: HOSPADM

## 2024-02-09 RX ORDER — ONDANSETRON 4 MG/1
4 TABLET, ORALLY DISINTEGRATING ORAL EVERY 6 HOURS PRN
Status: DISCONTINUED | OUTPATIENT
Start: 2024-02-09 | End: 2024-02-12 | Stop reason: HOSPADM

## 2024-02-09 RX ADMIN — ACETAMINOPHEN 975 MG: 325 TABLET, FILM COATED ORAL at 21:18

## 2024-02-09 RX ADMIN — PRENATAL VITAMINS-IRON FUMARATE 27 MG IRON-FOLIC ACID 0.8 MG TABLET 1 TABLET: at 10:26

## 2024-02-09 RX ADMIN — KETOROLAC TROMETHAMINE 30 MG: 30 INJECTION, SOLUTION INTRAMUSCULAR; INTRAVENOUS at 10:26

## 2024-02-09 RX ADMIN — ACETAMINOPHEN 975 MG: 325 TABLET, FILM COATED ORAL at 08:19

## 2024-02-09 RX ADMIN — ACETAMINOPHEN 975 MG: 325 TABLET, FILM COATED ORAL at 01:36

## 2024-02-09 RX ADMIN — ACETAMINOPHEN 975 MG: 325 TABLET, FILM COATED ORAL at 14:45

## 2024-02-09 RX ADMIN — SENNOSIDES AND DOCUSATE SODIUM 1 TABLET: 8.6; 5 TABLET ORAL at 10:26

## 2024-02-09 RX ADMIN — IBUPROFEN 800 MG: 800 TABLET, FILM COATED ORAL at 22:50

## 2024-02-09 RX ADMIN — LEVOTHYROXINE SODIUM 175 MCG: 0.1 TABLET ORAL at 08:19

## 2024-02-09 RX ADMIN — KETOROLAC TROMETHAMINE 30 MG: 30 INJECTION, SOLUTION INTRAMUSCULAR; INTRAVENOUS at 16:36

## 2024-02-09 RX ADMIN — SENNOSIDES AND DOCUSATE SODIUM 1 TABLET: 8.6; 5 TABLET ORAL at 01:36

## 2024-02-09 RX ADMIN — SENNOSIDES AND DOCUSATE SODIUM 1 TABLET: 8.6; 5 TABLET ORAL at 21:18

## 2024-02-09 RX ADMIN — KETOROLAC TROMETHAMINE 30 MG: 30 INJECTION, SOLUTION INTRAMUSCULAR; INTRAVENOUS at 04:12

## 2024-02-09 ASSESSMENT — ACTIVITIES OF DAILY LIVING (ADL)
ADLS_ACUITY_SCORE: 20
ADLS_ACUITY_SCORE: 20
ADLS_ACUITY_SCORE: 21
ADLS_ACUITY_SCORE: 20
ADLS_ACUITY_SCORE: 20
ADLS_ACUITY_SCORE: 21
ADLS_ACUITY_SCORE: 20
ADLS_ACUITY_SCORE: 20
ADLS_ACUITY_SCORE: 21
ADLS_ACUITY_SCORE: 20
ADLS_ACUITY_SCORE: 21
ADLS_ACUITY_SCORE: 20

## 2024-02-09 NOTE — PLAN OF CARE
Patient meeting expected goals. Is up independent in room, meeting all personal and infant needs. VSS. Pain is being managed with Tylenol and Toradol . Post smallwood removal; voiding without difficulty. MD removed pressure dressing and steri strips. Incision to low abdomen is well approximated, with no drainage or signs of infection noted to surrounding tissue. MD requested new steri strips be placed and covered with ABD pad. Writer completed this using sterile technique. Patient tolerated well. Breastfeeding is going well and bonding well with infant. Lactation also worked with couplet. Encouraged fluids and walking halls for DVT prevention and to decrease LE edema; 2+.

## 2024-02-09 NOTE — PROVIDER NOTIFICATION
02/08/24 1358   Provider Notification   Provider Name/Title DR. Rivas   Method of Notification Electronic Page   Request Evaluate in Person   Notification Reason Uterine Activity;Pain;Decels;Status Update;SVE      at bedside r/t decelerations. FHR strip reviewed by  at bedside. Patient requested IV fentanyl at 1341 after using nitrous oxide for some time with little relief reported. SVE 6/90/-1 at 1331. Patient did report good relief with IV fentanyl. Intermittent early, late and variable decelerations noted shortly after IV fentanyl was administered. Interventions included: Pitocin stopped, IV fluid bolus started and repositioned. Patient reported vaginal pressure. SVE 7/90/-1 by  at 1403. FSE applied by Dr. Rivas after talking with patient. FHR showed improvement with interventions.  left L&D and returned to clinic. Nurse instructed by  to notify  or  if needed as she will be unavailable after 1430 but will notify L&D unit when available again.

## 2024-02-09 NOTE — PROVIDER NOTIFICATION
24 1830   Provider Notification   Provider Name/Title    Method of Notification At Bedside   Request Evaluate in Person   Notification Reason Pain;Uterine Activity;SVE;Status Update      at the bedside to evaluate. FHR strip reviewed and noted PD and discussed interventions done. SVE  since 1356 and pitocin stopped since 135. VSS. Patient comfortable with epidural. Contractions irregular, mild to moderate with palpation currently.  discussed options at this point of:  now, restart pitocin and place IUPC and then recheck cervix in 1 hour and if no change then proceed to . Patient did eat a protein bar at 1730 by accident after being told diet changed to clear fluid with epidural placement-patient apologized. Patient and FOB decided on restarting pitocin at 1 unit and place IUPC. SVE by Dr. Pringle . IUPC placed by .  will remain on the unit and will recheck cervix at 1945 to determine plan of care. Patient and FOB agreeable with plan of care.

## 2024-02-09 NOTE — PLAN OF CARE
RN noticed small amount of new serosang drainage on island dressing on incision. Dressing is still intact and pan under control per patient report. Per verbal orders from MD, will apply pressure foam dressing over island dressing.

## 2024-02-09 NOTE — PROVIDER NOTIFICATION
24 1950   Vaginal Exam   Cervical Dilation (cm) 8   Cervical Effacement 90%   Fetal Presentation & Position cephalic     MD at bedside to perform SVE. Unchanged from previous exam. After discussing arrest of dilation with patient, plan will be move forward with a .

## 2024-02-09 NOTE — PROGRESS NOTES
Public Health Nurse (PHN) in to see patient to discuss [facility] ([facility]) programs. Patient is not interested in referral for a nurse visit at this time but will reach out to [facility] if interested in scheduling a nurse visit. PHN discussed [facility] community resource guide and rack cards and left these resources with patient.

## 2024-02-09 NOTE — PROGRESS NOTES
Post-partum Note      S: Patient is doing well today.  Pain is controlled with PO medications.  Tolerating regular diet without nausea or vomiting.  Ambulating without dizziness.  Urinating without difficulty. Lochia normal.  Breastfeeding. Had some oozing overnight from incision, had pressure dressing applied.     O:  Patient Vitals for the past 24 hrs:   BP Temp Temp src Pulse Resp SpO2 Weight   02/09/24 0412 117/54 98.1  F (36.7  C) Oral 76 16 -- --   02/09/24 0300 128/62 -- -- 85 18 -- --   02/09/24 0208 130/68 -- -- 80 16 -- --   02/09/24 0100 130/70 -- -- 75 -- -- --   02/09/24 0027 130/73 -- -- -- -- -- --   02/09/24 0012 130/75 98.6  F (37  C) Oral -- -- -- --   02/08/24 2357 127/78 -- -- -- -- 92 % --   02/08/24 2342 134/76 -- -- -- -- -- --   02/08/24 2327 135/80 -- -- -- -- -- --   02/08/24 2312 131/77 -- -- -- -- 94 % --   02/08/24 2257 127/69 -- -- -- -- -- --   02/08/24 2238 -- -- -- -- -- 96 % --   02/08/24 2227 (!) 143/72 98.6  F (37  C) Oral -- -- -- --   02/08/24 2212 (!) 142/67 98.4  F (36.9  C) Oral -- -- -- --   02/08/24 2157 (!) 145/91 98.4  F (36.9  C) Oral -- -- -- --   02/08/24 2152 135/71 -- -- -- -- -- --   02/08/24 2022 137/75 98.5  F (36.9  C) Oral -- -- 98 % 95.7 kg (211 lb)   02/08/24 1947 116/59 -- -- -- -- -- --   02/08/24 1915 128/69 -- -- -- -- 99 % --   02/08/24 1845 127/72 -- -- -- -- 98 % --   02/08/24 1843 130/71 -- -- -- -- -- --   02/08/24 1835 123/58 -- -- -- -- (P) 94 % --   02/08/24 1830 130/63 98.7  F (37.1  C) Oral -- -- 94 % --   02/08/24 1825 115/60 -- -- -- -- 95 % --   02/08/24 1820 121/67 -- -- -- -- 94 % --   02/08/24 1815 119/66 -- -- -- -- 95 % --   02/08/24 1810 134/82 -- -- -- -- 96 % --   02/08/24 1805 129/73 -- -- -- -- 96 % --   02/08/24 1758 (!) 140/72 -- -- -- -- -- --   02/08/24 1748 117/60 -- -- -- -- 96 % --   02/08/24 1742 120/61 -- -- -- -- 96 % --   02/08/24 1735 121/71 98.2  F (36.8  C) Oral -- -- 97 % --   02/08/24 1734 119/73 -- -- -- -- -- --    24 1732 124/78 -- -- -- -- -- --   24 1730 (!) 132/91 -- -- -- -- 99 % --   24 1728 (!) 137/91 -- -- -- -- 99 % --   24 1726 135/82 -- -- -- -- -- --   24 1722 -- -- -- -- -- 97 % --   24 1717 -- -- -- -- -- 98 % --   24 1712 -- -- -- -- -- 98 % --   24 1557 -- 98.4  F (36.9  C) Oral -- -- -- --   24 1529 (!) 142/82 -- -- -- -- -- --   24 1500 -- 97.9  F (36.6  C) Oral -- 20 -- --   24 1410 (!) 141/83 98.3  F (36.8  C) Oral -- -- -- --   24 1257 -- 98.2  F (36.8  C) Oral -- 18 -- --   24 1150 (!) 135/92 98.1  F (36.7  C) Oral -- 20 -- --   24 1100 135/85 -- -- -- -- -- --   24 1045 -- 97.8  F (36.6  C) Oral -- 20 -- --   24 0939 134/84 97.4  F (36.3  C) Oral -- 18 -- --       Gen:  Resting comfortably, NAD  Pulm:  Breathing comfortably on room air  Abd:  Soft, appropriately ttp, non-distended.Fundus at umbilicus, firm and non-tender.  Incision: removed pressure and island dressing that was saturated. No current bleeding from incision. Incision without gaping. No hematoma formation   Ext:  non-tender, trace bilateral LE edema    I/O last 3 completed shifts:  In: 3487.2 [P.O.:1000; I.V.:2437.2; IV Piggyback:50]  Out:  [Urine:1075; Blood:907]    Most Recent 3 CBC's:  Recent Labs   Lab Test 24  1139 19  0007   WBC 10.3 6.6 4.4   HGB 12.6 13.1 13.1   MCV 83 87 85    230 247     Most Recent 2 LFT's:  Recent Labs   Lab Test 24  2318 19  0007   AST 38 32   ALT 13 37   ALKPHOS 146 47   BILITOTAL 0.2 0.3     PCR: too low to calculate : <6/46    Assessment/Plan:  23 year old  on POD1 after PLTCS for arrest of dilation after IOL for dates at 41+0 weeks. S/p tdap   Pregnancy complications:   -Hypothyroidism  -transferred care to [place] at 18 weeks then back at 35 wks   -h/o anxiety, no meds.     1. Continue with routine postpartum management  2. Incision is c/d/I with  sutures  -oozing overnight, dressing reinforced   -removed dressing this am, will replace steris and do abd dressing.   3. EBL: 907ml ; pre hemoglobin 12.6, post hemogobin pending, no symptoms of anemia.   4. O+ , Rubella immune  5. Feed: Breastfeeding  6. CV/RESP:   -GHTN: mild range pressures throughout the day yesterday. Meets criteria for GHTN, preeclampsia panel normal on 2/7. Will continue to monitor. RPT labs prn.   7. DVT PPX:ambulation  8. Anxiety, previously on meds 4 years ago. Plan  2 week pp mood checkup/incision check     Dispo: Anticipate DC home POD2-3 based on bp and recovery     MD Leonardo Gomez OB/GYN  2/9/2024, 8:38 AM

## 2024-02-09 NOTE — PROVIDER NOTIFICATION
02/08/24 2005   Provider Notification   Provider Name/Title Dr. Pringle   Method of Notification At Bedside     MD at bedside to describe procedure and sign consents with patient.

## 2024-02-09 NOTE — LACTATION NOTE
Lactation in to see patient with feeds. First baby for family. Reviewed breastfeeding education, when milk typically transitions, supply and demand, feeding every 2-3 hours or sooner as baby cueing. Patient had baby latched in cross cradle STS on right side. Wide mouth and flanged lips noted. Baby moved to a nutritive suck pattern with good swallows heard. Marga states she struggles with getting infant to latch on left, has been using football hold. Encouraged to call for assistance.    Writer called when ready to nurse on left side. Baby crying and pushing off of her breast in football. Switched patient to cross cradle and with assisted latch. Baby latched deep on with rhythmic sucking and swallows heard consistently. All questions answered at this time. Knows to call for assistance as needed.

## 2024-02-09 NOTE — PLAN OF CARE
Writer called lab to inform of a missed HGB that was to be drawn at 0600. Speciemn collection listed unit collect. Writer spoke to Enmanuel in lab department and he will change this to read lab collect. Someone will come now to collect HGB.

## 2024-02-09 NOTE — PLAN OF CARE
Transferred to postpartum unit at 0100. Room  orientation completed. Vital signs stable. Fundus firm, midline, at umbilicus. Lochia rubra and WNL. Perineum WNL. Incision dressing reinforced with pressure dressing - see provider notification note. Ambulating with stand by assist. Pain managed with PO Tylenol and IV Toradol. Breast feeding and tolerating well with minimal assistance. El removed at 0330. Attempted to void at 0600. Patient unable to void. PO fluids increased. Due to void by 0730. Spouse at bedside and attentive to and supportive of patient. Bonding well with infant. Encouraged pt to call with any questions or concerns. Will monitor as needed and continue with plan of care.

## 2024-02-09 NOTE — OP NOTE
Halie Rogel    2000   MRN [id number]     Operative Note     Date of Operation: 2024   Surgeon: Christa Pringle MD     Pre-operative diagnosis: IUP at 41w0d  - Arrest of dilation    Post-operative diagnosis: Same, delivered     Procedures: Primary lower transverse  section with double layer uterine closure via Pfannenstiel incision     Anesthesia: Spinal with duramorph  IVF: 900 mL  UOP: 250 mL  EBL: 907 mL    Indications: 23 year old  at 41w0d who presented for IOL for postdates.  She received PO cytotec for cervical ripening. AROM and IV pitocin were then used for augmentation.  During her labor course there were periods of category II FHR requiring IV pitocin to be discontinued. She continued to contract and reached 7-8 cm.  She received an epidural. She 4+ hours later she was still 7-8 cm and had another deceleration.  Once FHR recovered labor progress was discussed and last attempt to restart pitocin for augment was offered.  After 1 hour however and now 6 hours at 7-8 cm, arrest of dilation was diagnosed and a  section was recommended. Risks and benefits were reviewed and the patient consented for the procedure.    Complications: None apparent    Findings: A single vigorous, liveborn male weighing 8 lb 13.5 oz (4010g) with apgars of 9 and 9. Normal appearing uterus, fallopian tubes, ovaries.  No nuchal cord.  Clear amniontic fluid.  No fascial adhesions.  No intraabdominal adhesions from the uterus to the bladder and peritoneum.    Specimen: placenta, cord blood, cord segment    Procedure Details: The patient was taken back to the operating room where she underwent spinal anesthesia. She was then prepped and draped in the usual sterile fashion in the dorsal supine position with a leftward tilt. A time out was performed. A pfannenstiel skin incision was made with the scalpel and carried through the underlying layer to the fascia. The fascia was incised in the midline  and extended laterally with Ordoñez scissors. The superior aspect of the fascial incision was grasped with Kocher clamps, elevated and the underlying rectus muscles were dissected off bluntly and with Ordoñez scissors. Attention was then turned to the inferior aspect of the incision, which, in a similar fashion was grasped, tented up with Kocher clamps, and the rectus muscle dissected off bluntly and with Ordoñez scissors. The rectus muscles were then  in the midline. The peritoneum was then identified and entered bluntly. This was extended with sharp and blunt dissection. The Bandar-O retractor was then inserted. The vesicouterine peritoneum was grasped with pick-ups and entered sharply and the incision was extended laterally with Metzenbaum scissors. Then it was grasped and a bladder flap was created digitally. The lower uterine segment was incised in a transverse fashion with the scalpel. The incision was extended digitally. The bladder blade was removed. The infant's head was delivered, the body rotated, and the rest of the infant was delivered atraumatically. The cord was clamped and cut and then the baby was handed off to the nursing staff. A segment of cord was taken for cord gases. The placenta was then removed with gentle traction on the cord and fundal message. The uterus was exteriorized and cleared of all clots and debris. The uterine incision was repaired with 0-Vicryl in a running locked fashion. A second layer of 0-monocryl was used to imbricate the incision. A figure of X stitch was needed on the right aspect of the hysterotomy. The posterior cul-de-sac was cleared of clots and debris. The uterus was returned to the abdomen and noted to be hemostatic. The gutters were then cleared of clots. A kocher clamp was used to elevated the fascia superiorly and inferiorly and good hemostasis was noted.  The fascia was closed with 0-vicryl in a running fashion. The subcutaneous tissue was irrigated and areas of  bleeding were controlled with cautery. The subcutaneous tissue was reapproximated with 0 vicryl. The skin was closed with 4-0 monocryl. The patient tolerated the procedure well and was taken to the recovery room in stable condition. All lap, instrument, and sharps counts were correct times two.      Christa Pringle MD  [facility]

## 2024-02-09 NOTE — PROVIDER NOTIFICATION
02/08/24 1947   Provider Notification   Provider Name/Title Dr. Pringle   Method of Notification Phone   Request Evaluate - Remote   Notification Reason Status Update     MD paged to bedside to evaluate patient's SVE and make a POC moving forward.

## 2024-02-09 NOTE — PROVIDER NOTIFICATION
02/09/24 0145   Provider Notification   Provider Name/Title Dr. Ricci   Method of Notification Electronic Page   Request Evaluate-Remote   Notification Reason Status Update     Electronic page to provider to request verbal orders to remove C/S incision dressing. Site noted to be leaking fair amount of bright red blood. Unable to confirm active bleeding due to limited visualization of incision. Per provider, okay to remove and reinforce with pressure dressing. Dressing removed and reinforced with gauze and foam tape. Dressing remained clean, dry, and intact the remainder of shift.

## 2024-02-09 NOTE — PROVIDER NOTIFICATION
24   Provider Notification   Provider Name/Title Dr. Pringle   Method of Notification At Bedside     MD at bedside discussing  procedure with patient and answering questions before signing consents.

## 2024-02-09 NOTE — ANESTHESIA POSTPROCEDURE EVALUATION
Patient: Halie Rogel    Procedure: Procedure(s):   section       Anesthesia Type:  Epidural    Note:  Disposition: Inpatient   Postop Pain Control: Uneventful            Sign Out: Well controlled pain   PONV: No   Neuro/Psych: Uneventful            Sign Out: Acceptable/Baseline neuro status   Airway/Respiratory: Uneventful            Sign Out: Acceptable/Baseline resp. status   CV/Hemodynamics: Uneventful            Sign Out: Acceptable CV status; No obvious hypovolemia; No obvious fluid overload   Other NRE: NONE   DID A NON-ROUTINE EVENT OCCUR? No           Last vitals:  Vitals:    24 1845 24 1915 24   BP: 127/72 128/69 116/59   Resp:      Temp:      SpO2: 98% 99%        Electronically Signed By: Aaliyah Pink MD  2024  10:26 PM

## 2024-02-09 NOTE — PROVIDER NOTIFICATION
02/08/24 1752   Provider Notification   Provider Name/Title Dr Husain-In [facility]   Method of Notification At Bedside   Request Evaluate in Person   Notification Reason Decels;Pain;Uterine Activity     - in [facility] at bedside to evaluate. FHR showing PD present. Repositioned LL, RL, hands/knees and trendelenburg. IV fluid bolus initiated. Additional staff at bedside for help. SVE by nurse 7/90/0, unchanged since 1356. FSE already placed early by  after first PD, no IUPC placed. Pitocin stopped at 1355 after first PD throughout the day and patient has been making cervical change without pitocin. Contractions are irregular, moderate with palpation currently. Patient just received epidural at 1725 and reports good relief. VSS since epidural.  notified of PD as well as  (her back up) to come and evaluate in person-see note by Ellie ESPANA RN Charge. All questions and concerns addressed with patient and FOB. FHR strip reviewed by  from bedside and showing improvement with interventions. Patient now repositioned to RL with peanut ball, IV fluid bolus continues. Patient reports no pain and comfortable with epidural.

## 2024-02-09 NOTE — PLAN OF CARE
Data: Halie Rogel transferred to [facility] via cart at 0050. Baby transferred via parent's arms.  Action: Receiving unit notified of transfer: Yes. Patient and family notified of room change. Report given to KEZIA Rouse at 0100. Belongings sent to receiving unit. Accompanied by Registered Nurse. Oriented patient to surroundings. Call light within reach. ID bands double-checked with receiving RN.  Response: Patient tolerated transfer and is stable.    Patients mobililty level scored using the bedside mobility assistance tool (BMAT). Patient is at a mobility level test number: 3. Mobility equipment used: hovermat. Required assist of 2 staff members. Further use of BMAT scoring required.

## 2024-02-09 NOTE — PROVIDER NOTIFICATION
02/08/24 1750   Provider Notification   Provider Name/Title Dr. Ricci   Method of Notification Phone   Request Evaluate in Person   Notification Reason Decels     Dr. Ricci paged to notify of decels but has not responded to page.  Dr. Pringle is Dr. Aguirre back up and she is notified of prolonged decel.  Notified that In house is at bedside.  Dr. Pringle is 30 minutes out and is on her way.

## 2024-02-09 NOTE — PROVIDER NOTIFICATION
02/08/24 5414   Provider Notification   Provider Name/Title Dr. Ricci/Pino   Method of Notification Phone   Request Evaluate in Person   Notification Reason Decels       Dr. Ricci paged to notify of decels but has not responded to page.  Dr. Pringle is Dr. Aguirre back up and she is notified of prolonged decel.  Notified that In house is at bedside.  Dr. Pringle is 30 minutes out and is on her way.

## 2024-02-09 NOTE — PROGRESS NOTES
OB Progress Note    S:  Comfortable with epidural.    O:    Vitals:    24 1843 24 1845 24 1915 24 1947   BP: 130/71 127/72 128/69 116/59   BP Location:       Patient Position:       Cuff Size:       Resp:       Temp:       TempSrc:       SpO2:  98% 99%      EFM: baseline 110, accelerations absent, no decels, moderate variability;   [place]:  Ctx q2-5 min  SVE:  8/90/0  Membranes: AROM 8:28am, clear fluid    A/P:  23 year old  @41w0d admitted for IOL secondary to post-term pregnancy.    1.  Routine cares  2.  Labor: Still 7-8 cm. Now has been for 6 hours. Recommended proceeding with  section for arrest of dilation. Discussed risks, benefits and alternatives including risk of bleeding, infection and injury to adjacent organs. Informed consent signed and patient agreed to proceed.    3.  Elevated BP's: Intermittently mildly elevated BPs, 2+ LE edema over past 2 weeks, normal preE labs, urine negative    Christa Pringle MD  2024

## 2024-02-09 NOTE — ANESTHESIA CARE TRANSFER NOTE
Patient: Halie Rogel    Procedure: Procedure(s):   section       Diagnosis: * No pre-op diagnosis entered *  Diagnosis Additional Information: No value filed.    Anesthesia Type:   Epidural     Note:    Oropharynx: spontaneously breathing  Level of Consciousness: awake  Oxygen Supplementation: room air    Independent Airway: airway patency satisfactory and stable  Dentition: dentition unchanged  Vital Signs Stable: post-procedure vital signs reviewed and stable  Report to RN Given: handoff report given  Patient transferred to: Labor and Delivery  Comments: To L/D, report to RN.        Vitals:  Vitals Value Taken Time   /71 24 2152   Temp     Pulse     Resp     SpO2     Vitals shown include unfiled device data.    Electronically Signed By: REMY Kelsey CRNA  2024  9:54 PM

## 2024-02-09 NOTE — PROGRESS NOTES
OB Progress Note    Notified by RN regarding decelerations    S:  Comfortable with epidural.    O:    Vitals:    24 1805 24 1815 24 1820 24 1825   BP: 129/73 119/66 121/67 115/60   BP Location:       Patient Position:       Cuff Size:       Resp:       Temp:       TempSrc:       SpO2: 96% 95% 94% 95%     EFM: baseline 110, accelerations absent, prolonged decelerations, moderate variability; Category II  [place]:  Ctx q2-5 min  SVE:  8/90/0  Membranes: AROM 8:28am, clear fluid    A/P:  23 year old  @41w0d admitted for IOL secondary to post-term pregnancy.    1.  Routine cares  2.  IOL: Pitocin now off since early afternoon due to deceleration (early and prolonged), was continuing to contract painfully.   - Reviewed labor course so far, 8 cm but minimal progress over the course of 5 hours now. Have not restarted pitocin. Has had recovery from current decelerations. Discussed plan of care with patient - concerned about lack of progress and fetal decelerations. Discussed option to trial restart of IV pitocin for augmentation with IUPC in place. Would recheck cervix in 1 hour and if unchanged would recommend C/S due to arrest of dilation. If recurrent decelerations in that time would recommend C/S due to cat II remote from delivery and inability to augment labor. Pt and partner indicated understanding with plan of care. All questions answered.    3.  Elevated BP's: Mildly elevated BPs, 2+ LE edema over past 2 weeks, normal preE labs, urine negative    Christa Pringle MD  2024

## 2024-02-10 PROCEDURE — 250N000013 HC RX MED GY IP 250 OP 250 PS 637: Performed by: OBSTETRICS & GYNECOLOGY

## 2024-02-10 PROCEDURE — 999N000081 HC STATISTIC IP LACTATION SERVICES 31-45 MIN

## 2024-02-10 PROCEDURE — 120N000001 HC R&B MED SURG/OB

## 2024-02-10 RX ADMIN — IBUPROFEN 800 MG: 800 TABLET, FILM COATED ORAL at 23:03

## 2024-02-10 RX ADMIN — IBUPROFEN 800 MG: 800 TABLET, FILM COATED ORAL at 05:03

## 2024-02-10 RX ADMIN — ACETAMINOPHEN 975 MG: 325 TABLET, FILM COATED ORAL at 10:12

## 2024-02-10 RX ADMIN — IBUPROFEN 800 MG: 800 TABLET, FILM COATED ORAL at 16:17

## 2024-02-10 RX ADMIN — GUAIFENESIN AND DEXTROMETHORPHAN HYDROBROMIDE 1 TABLET: 30; 600 TABLET, EXTENDED RELEASE ORAL at 00:06

## 2024-02-10 RX ADMIN — SENNOSIDES AND DOCUSATE SODIUM 1 TABLET: 8.6; 5 TABLET ORAL at 10:12

## 2024-02-10 RX ADMIN — IBUPROFEN 800 MG: 800 TABLET, FILM COATED ORAL at 10:12

## 2024-02-10 RX ADMIN — SENNOSIDES AND DOCUSATE SODIUM 1 TABLET: 8.6; 5 TABLET ORAL at 23:03

## 2024-02-10 RX ADMIN — LEVOTHYROXINE SODIUM 175 MCG: 0.1 TABLET ORAL at 07:30

## 2024-02-10 RX ADMIN — ACETAMINOPHEN 975 MG: 325 TABLET, FILM COATED ORAL at 03:14

## 2024-02-10 RX ADMIN — PRENATAL VITAMINS-IRON FUMARATE 27 MG IRON-FOLIC ACID 0.8 MG TABLET 1 TABLET: at 10:12

## 2024-02-10 RX ADMIN — ACETAMINOPHEN 975 MG: 325 TABLET, FILM COATED ORAL at 23:03

## 2024-02-10 RX ADMIN — ACETAMINOPHEN 975 MG: 325 TABLET, FILM COATED ORAL at 16:17

## 2024-02-10 ASSESSMENT — ACTIVITIES OF DAILY LIVING (ADL)
ADLS_ACUITY_SCORE: 20

## 2024-02-10 NOTE — PROVIDER NOTIFICATION
02/09/24 2300   Provider Notification   Provider Name/Title Dr. Castellanos   Method of Notification Phone   Request Evaluate-Remote   Notification Reason Medication Request     MD paged due to patient report of feeling congested and 'full of phlegm'    Orders for Mucinex DM q12h PRN

## 2024-02-10 NOTE — PROGRESS NOTES
Gyn Post-operative Note POD# 2    S:  Patient doing well.  Pain well controlled on pain medication.  Ambulating.  Tolerating regular diet.  El out.  Voiding.  Passing flatus.  Lower extremities feeling swollen.     O:   /73 (BP Location: Left arm, Patient Position: Semi-Sebastian's, Cuff Size: Adult Regular)   Pulse 78   Temp 98.4  F (36.9  C) (Oral)   Resp 16   Wt 95.7 kg (211 lb)   SpO2 92%   Breastfeeding Unknown   BMI 35.11 kg/m     Vitals:    02/09/24 1347 02/09/24 1837 02/09/24 2110 02/10/24 0311   BP: 112/67 135/79 126/81 124/73   BP Location: Left arm Left arm Left arm Left arm   Patient Position:  Semi-Sebastian's Semi-Sebastian's Semi-Sebastian's   Cuff Size:  Adult Regular Adult Regular Adult Regular   Pulse: 82 87 83 78   Resp: 16 14 16 16   Temp: 97.9  F (36.6  C) 98.2  F (36.8  C) 98.4  F (36.9  C) 98.4  F (36.9  C)   TempSrc: Oral Oral Oral Oral   SpO2:       Weight:           No intake/output data recorded.  Gen- A&O, NAD  Abd- soft, non-tender, non-distended, no guarding or rebound  Incision(s)- C/D/I (steri strips)  Ext- Non-tender, 1+ edema    Labs:    Hemoglobin   Date Value Ref Range Status   02/09/2024 8.8 (L) 11.7 - 15.7 g/dL Final   07/04/2019 13.1 11.7 - 15.7 g/dL Final   ]     A/P:  23 year old POD# POD2 after PLTCS for arrest of dilation after IOL for dates at 41+0 weeks. S/p tdap   Pregnancy complications:   -Hypothyroidism  -transferred care to [place] at 18 weeks then back at 35 wks   -h/o anxiety, no meds.      1. Continue with routine postpartum management  2. Incision with pressure dressing  -oozing 2 nights ago, pressure dressing removed yesterday  3. EBL: 907ml ; pre hemoglobin 12.6, post hemogobin 8.8 consistent with acute blood loss anemia. No symptoms of anemia. Home on oral iron.  4. O+ , Rubella immune  5. Feed: Breastfeeding  6. CV/RESP:   -GHTN: criteria met by mild range pressures 2/8. Bps normal past 24 hours. Will continue to monitor. RPT labs prn.   7. DVT  PPX:ambulation  8. Anxiety, previously on meds 4 years ago. Plan 2 week pp mood checkup/incision check      Dispo:   Anticipate DC home POD3    Carmelina Ng MD  2/10/2024  9:18 AM

## 2024-02-10 NOTE — PLAN OF CARE
Patient meeting expected goals. Is up independent in room, meeting all personal and infant needs. VSS. Pain is being managed with Tylenol and Ibuprofen . Voiding with out difficulty. Incision to low abdomen is FRANCA with steri strips, well approximated, without drainage and no signs of infection noted to surrounding tissue.    Infant cluster feeding, not as satisfied. Not latching as well as yesterday. Discussed supplementation; mother open to this.          20 mls of donor milk given to infant via cup by Lactation RN after BF. Tolerated well. Patient aware if supplementation will be more frequently, she should start pumping. Plan for discharge home tomorrow.

## 2024-02-10 NOTE — LACTATION NOTE
Lactation in to follow up with patient. Per parents baby struggled with feeds overnight. Baby due for a feed at time of visit. Large drop of colostrum expressed, baby eager to latch. Needing breast compressions to hear more frequent swallows. Nursed both breasts. More swallows heard on right side. Baby continue to cue after feed. Parents deciding if they want to supplement. Decided on using donor milk to supplement via cup. Discussed possible pumping if needing to supplement more. Patient states she has EBM at home, knows she can bring her EBM to hospital if wanting to use for baby.   1335 assisted with feed. Baby went three hours and is eager to eat. Encouraged frequent feeds. Baby latched and nursed with lots of breast compressions. Gulping heard. Knows to call for donor if baby not settled after feed. Reviewed signs of hydration with parents.

## 2024-02-10 NOTE — PLAN OF CARE
Vital signs stable. Fundus firm, midline, at the umbilicus. Lochia rubra and scant. C/S incision covered with steri-strips, open to air and CDI. Voiding without difficulty. Ambulating independently. Pain managed with Tylenol and Ibuprofen. Breastfeeding and tolerating well with occasional assistance. FOB at bedside and attentive to and supportive of patient. Bonding well with infant. Independent with self and infant cares. Encouraging pt to call with any questions or concerns. Will monitor as needed and continue with plan of care.      Parkinsons disease

## 2024-02-10 NOTE — PROVIDER NOTIFICATION
02/10/24 4730   Provider Notification   Provider Name/Title Dr. Contreras   Method of Notification In Department   Request Evaluate in Person   Notification Reason Status Update     Writer updated this MD on how feedings are going. Infant cluster feeding, not as satisfied. Not latching as well as yesterday. Discussed supplementation; mother open to this. VSS. Is voiding and stooling and parents are bonding well. MD states; plan for discharge tomorrow after circumcision.

## 2024-02-11 LAB
ALT SERPL W P-5'-P-CCNC: 13 U/L (ref 0–50)
AST SERPL W P-5'-P-CCNC: 21 U/L (ref 0–45)
CREAT SERPL-MCNC: 0.78 MG/DL (ref 0.51–0.95)
EGFRCR SERPLBLD CKD-EPI 2021: >90 ML/MIN/1.73M2
ERYTHROCYTE [DISTWIDTH] IN BLOOD BY AUTOMATED COUNT: 14.1 % (ref 10–15)
HCT VFR BLD AUTO: 31.7 % (ref 35–47)
HGB BLD-MCNC: 10.2 G/DL (ref 11.7–15.7)
MCH RBC QN AUTO: 27.4 PG (ref 26.5–33)
MCHC RBC AUTO-ENTMCNC: 32.2 G/DL (ref 31.5–36.5)
MCV RBC AUTO: 85 FL (ref 78–100)
PLATELET # BLD AUTO: 186 10E3/UL (ref 150–450)
RBC # BLD AUTO: 3.72 10E6/UL (ref 3.8–5.2)
WBC # BLD AUTO: 11.5 10E3/UL (ref 4–11)

## 2024-02-11 PROCEDURE — 999N000080 HC STATISTIC IP LACTATION SERVICES 16-30 MIN

## 2024-02-11 PROCEDURE — 120N000001 HC R&B MED SURG/OB

## 2024-02-11 PROCEDURE — 84460 ALANINE AMINO (ALT) (SGPT): CPT | Performed by: OBSTETRICS & GYNECOLOGY

## 2024-02-11 PROCEDURE — 250N000013 HC RX MED GY IP 250 OP 250 PS 637: Performed by: OBSTETRICS & GYNECOLOGY

## 2024-02-11 PROCEDURE — 85027 COMPLETE CBC AUTOMATED: CPT | Performed by: OBSTETRICS & GYNECOLOGY

## 2024-02-11 PROCEDURE — 82565 ASSAY OF CREATININE: CPT | Performed by: OBSTETRICS & GYNECOLOGY

## 2024-02-11 PROCEDURE — 36415 COLL VENOUS BLD VENIPUNCTURE: CPT | Performed by: OBSTETRICS & GYNECOLOGY

## 2024-02-11 PROCEDURE — 84450 TRANSFERASE (AST) (SGOT): CPT | Performed by: OBSTETRICS & GYNECOLOGY

## 2024-02-11 RX ORDER — FERROUS SULFATE 325(65) MG
325 TABLET, DELAYED RELEASE (ENTERIC COATED) ORAL DAILY
Qty: 60 TABLET | Refills: 0 | Status: SHIPPED | OUTPATIENT
Start: 2024-02-11

## 2024-02-11 RX ORDER — IBUPROFEN 800 MG/1
800 TABLET, FILM COATED ORAL EVERY 6 HOURS
Qty: 40 TABLET | Refills: 0 | Status: SHIPPED | OUTPATIENT
Start: 2024-02-11

## 2024-02-11 RX ORDER — NIFEDIPINE 30 MG/1
30 TABLET, EXTENDED RELEASE ORAL DAILY
Status: DISCONTINUED | OUTPATIENT
Start: 2024-02-11 | End: 2024-02-12 | Stop reason: HOSPADM

## 2024-02-11 RX ORDER — AMOXICILLIN 250 MG
1 CAPSULE ORAL 2 TIMES DAILY PRN
Qty: 40 TABLET | Refills: 0 | Status: SHIPPED | OUTPATIENT
Start: 2024-02-11

## 2024-02-11 RX ORDER — ACETAMINOPHEN 325 MG/1
975 TABLET ORAL EVERY 6 HOURS
Qty: 60 TABLET | Refills: 0 | Status: SHIPPED | OUTPATIENT
Start: 2024-02-11

## 2024-02-11 RX ADMIN — PRENATAL VITAMINS-IRON FUMARATE 27 MG IRON-FOLIC ACID 0.8 MG TABLET 1 TABLET: at 08:10

## 2024-02-11 RX ADMIN — LEVOTHYROXINE SODIUM 175 MCG: 0.1 TABLET ORAL at 08:11

## 2024-02-11 RX ADMIN — IBUPROFEN 800 MG: 800 TABLET, FILM COATED ORAL at 12:12

## 2024-02-11 RX ADMIN — NIFEDIPINE 30 MG: 30 TABLET, FILM COATED, EXTENDED RELEASE ORAL at 12:12

## 2024-02-11 RX ADMIN — ACETAMINOPHEN 975 MG: 325 TABLET, FILM COATED ORAL at 18:30

## 2024-02-11 RX ADMIN — ACETAMINOPHEN 975 MG: 325 TABLET, FILM COATED ORAL at 12:11

## 2024-02-11 RX ADMIN — SENNOSIDES AND DOCUSATE SODIUM 1 TABLET: 8.6; 5 TABLET ORAL at 21:49

## 2024-02-11 RX ADMIN — ACETAMINOPHEN 975 MG: 325 TABLET, FILM COATED ORAL at 05:21

## 2024-02-11 RX ADMIN — IBUPROFEN 800 MG: 800 TABLET, FILM COATED ORAL at 18:30

## 2024-02-11 RX ADMIN — SENNOSIDES AND DOCUSATE SODIUM 1 TABLET: 8.6; 5 TABLET ORAL at 08:10

## 2024-02-11 RX ADMIN — IBUPROFEN 800 MG: 800 TABLET, FILM COATED ORAL at 05:21

## 2024-02-11 ASSESSMENT — ACTIVITIES OF DAILY LIVING (ADL)
ADLS_ACUITY_SCORE: 20

## 2024-02-11 NOTE — PROVIDER NOTIFICATION
02/11/24 1000   Provider Notification   Provider Name/Title Dr. Mays   Method of Notification In Department   Request Evaluate-Remote   Notification Reason Vital Signs Change     Updated this MD on B/P of 141/81, denies preeclamptic sx.. Writer is rechecking BP in 1 hour. MD agrees with plan and wants update if again systolic 140's. If stable, ok to discharge to home with a follow up in clinic in 1 week for BP check.,

## 2024-02-11 NOTE — PLAN OF CARE
Data: Vital signs within normal limits. Postpartum checks within normal limits - see flow record. Patient eating and drinking normally. Patient able to empty bladder independently and is up ambulating. No apparent signs of infection. Patient performing self cares, is able to care for infant and is breastfeeding every 2-3 hours with supplementing with cup feeding donor breast milk.  Incision appears within normal. Is using Ibuprofen and Tylenol for mild discomfort.   Action: Patient medicated during the shift for cramping. See MAR. Patient education done, see flow record.  Response: Positive attachment behaviors observed with infant. Patient's spouse present this shift.   Plan: Continue current plan of care.  Anticipate discharge on 2/11/24.

## 2024-02-11 NOTE — LACTATION NOTE
Follow up with patient who has to stay for elevated blood pressures. Marga called out for assistance. Baby at over a 9% weight loss. Baby had been nursing well over night per patient and was struggling with this feed at the 3 hour bren. Encouraged parents to start feeds sooner so baby patient at breast. A couple of mls of formula given to settle baby, then he was brought to the breast in football hold. Milk dripping from Marga's breast with no stimulation. Baby latch and moved to a nutritive suck pattern with many swallows, at times 1:1 suck swallow ratio. Encouraged breast compressions to keep baby active. Patient to start pumping. Will watch and do just 10 minutes as milk coming in and risk for engorgement discussed.  Will follow up with any EBM or formula if baby still wanting to feed after nursing both sides. Showed Marga how to use pump. Started patient off of maintain mode and encouraged to shut pump off after 10 minutes. Possible to do a early weight to make sure feeding plan affective. Encouraged to call for any questions or concerns.

## 2024-02-11 NOTE — PROVIDER NOTIFICATION
02/11/24 1120   Provider Notification   Provider Name/Title Dr. Mays   Method of Notification Phone   Request Evaluate in Person   Notification Reason Vital Signs Change;Status Update     Paged MD to updated on BP of 153/86; patient denies preeclamptic sx..   Awaiting call back. Paged x 3.

## 2024-02-11 NOTE — PLAN OF CARE
Spoke with MD and updated on elevated BP readings. MD ordered labs, will cancel discharge and start patient on 30 mg of Nifedipine. Patient sad, but understands why she has to stay. Infant will also stay inpatient for clinical issues. Patient aware that someone should be with infant and all times as she may not be able to fully care for infant due to her clinical changes. Continues to deny and/all preeclamptic sx..

## 2024-02-11 NOTE — PROGRESS NOTES
Notified by RN of new elevated BPs, 156/86. Known GHTN. Repeat labs now. Start procardia 30mg daily. Cancel discharge home today and monitor blood pressures. Possible discharge home tomorrow.     Sunshine Castellanos MD   [facility]  2/11/2024 12:01 PM

## 2024-02-11 NOTE — PROGRESS NOTES
Gyn Post-operative Note POD# 3    S:  Patient doing well.  Pain well controlled on pain medication.  Ambulating.  Tolerating regular diet.  Voiding.  Passing flatus, has had BMs.      O:   /82 (BP Location: Left arm, Patient Position: Semi-Sebastian's, Cuff Size: Adult Regular)   Pulse 66   Temp 98.6  F (37  C) (Oral)   Resp 16   Wt 95.7 kg (211 lb)   SpO2 92%   Breastfeeding Unknown   BMI 35.11 kg/m     Vitals:    02/10/24 0311 02/10/24 1012 02/10/24 1736 02/10/24 2300   BP: 124/73 132/79 136/80 139/82   BP Location: Left arm Left arm Left arm Left arm   Patient Position: Semi-Sebastian's Semi-Sebastian's Semi-Sebastian's Semi-Sebastian's   Cuff Size: Adult Regular Adult Regular Adult Regular Adult Regular   Pulse: 78 90  66   Resp: 16 18 18 16   Temp: 98.4  F (36.9  C) 98  F (36.7  C) 98.6  F (37  C) 98.6  F (37  C)   TempSrc: Oral Oral Oral Oral   SpO2:       Weight:         Gen- A&O, NAD  Abd- soft, non-tender, non-distended, no guarding or rebound  Incision(s)- C/D/I (steri strips)  Ext- Non-tender, 1+ edema    Labs:    Hemoglobin   Date Value Ref Range Status   02/09/2024 8.8 (L) 11.7 - 15.7 g/dL Final   07/04/2019 13.1 11.7 - 15.7 g/dL Final   ]     A/P:  23 year old POD# POD3 after PLTCS for arrest of dilation after IOL for dates at 41+0 weeks. S/p tdap   Pregnancy complications:   -Hypothyroidism  -transferred care to [place] at 18 weeks then back at 35 wks   -h/o anxiety, no meds.      1. Continue with routine postpartum management  2. GHTN: BP upper normal range. Plan BP check within one week.   3. EBL: 907ml ; pre hemoglobin 12.6, post hemogobin 8.8 consistent with acute blood loss anemia. No symptoms of anemia. Home on oral iron.  4. Anxiety, previously on meds 4 years ago. Plan 2 week pp mood checkup/incision check   5. O+ , Rubella immune  6. Feed: Breastfeeding    Dispo:   Anticipate DC home today, POD3. Warning signs and when to call reviewed. Follow-up later this week for BP check and mood check.      MD Leonardo [facility]  2/11/2024 9:50 AM

## 2024-02-11 NOTE — PLAN OF CARE
Goal Outcome Evaluation:      Plan of Care Reviewed With: patient, spouse    Overall Patient Progress: improvingOverall Patient Progress: improving     VSS. Incision WDL. Up independently. Voiding without difficulty. Pain managed with tylenol and ibuprofen. Breastfeeding. Positive bonding observed with infant. FOB supportive and involved in cares.

## 2024-02-12 VITALS
WEIGHT: 192 LBS | TEMPERATURE: 98.6 F | DIASTOLIC BLOOD PRESSURE: 67 MMHG | SYSTOLIC BLOOD PRESSURE: 152 MMHG | RESPIRATION RATE: 16 BRPM | OXYGEN SATURATION: 92 % | HEART RATE: 83 BPM | BODY MASS INDEX: 31.95 KG/M2

## 2024-02-12 PROCEDURE — 250N000013 HC RX MED GY IP 250 OP 250 PS 637: Performed by: OBSTETRICS & GYNECOLOGY

## 2024-02-12 RX ADMIN — SENNOSIDES AND DOCUSATE SODIUM 1 TABLET: 8.6; 5 TABLET ORAL at 08:37

## 2024-02-12 RX ADMIN — IBUPROFEN 800 MG: 800 TABLET, FILM COATED ORAL at 00:30

## 2024-02-12 RX ADMIN — NIFEDIPINE 30 MG: 30 TABLET, FILM COATED, EXTENDED RELEASE ORAL at 08:37

## 2024-02-12 RX ADMIN — LEVOTHYROXINE SODIUM 175 MCG: 0.1 TABLET ORAL at 08:37

## 2024-02-12 RX ADMIN — ACETAMINOPHEN 975 MG: 325 TABLET, FILM COATED ORAL at 13:09

## 2024-02-12 RX ADMIN — IBUPROFEN 800 MG: 800 TABLET, FILM COATED ORAL at 13:09

## 2024-02-12 RX ADMIN — PRENATAL VITAMINS-IRON FUMARATE 27 MG IRON-FOLIC ACID 0.8 MG TABLET 1 TABLET: at 08:37

## 2024-02-12 RX ADMIN — ACETAMINOPHEN 975 MG: 325 TABLET, FILM COATED ORAL at 06:31

## 2024-02-12 RX ADMIN — ACETAMINOPHEN 975 MG: 325 TABLET, FILM COATED ORAL at 00:30

## 2024-02-12 RX ADMIN — IBUPROFEN 800 MG: 800 TABLET, FILM COATED ORAL at 06:31

## 2024-02-12 ASSESSMENT — ACTIVITIES OF DAILY LIVING (ADL)
ADLS_ACUITY_SCORE: 20

## 2024-02-12 NOTE — PROGRESS NOTES
[facility]   Obstetrics Post-Op / POD # 4         Interval History:     Doing well.  Pain is controlled.   Good appetite.  Denies chest pain, shortness of breath, nausea or vomiting.  Breastfeeding well.  Pt doing well. She denies preeclampsia symptoms.          Significant Problems:      Patient Active Problem List   Diagnosis    Abnormal finding on thyroid function test    Hypothyroidism due to Hashimoto's thyroiditis    Hashimoto's thyroiditis    Indication for care in labor or delivery             Review of Systems:    The patient denies any chest pain, shortness of breath, excessive pain, fever, chills, purulent drainage from the wound, nausea or vomiting.          Medications:   All medications related to the patient's surgery have been reviewed          Physical Exam:   Patient Vitals for the past 24 hrs:   BP Temp Temp src Pulse Resp Weight   24 0833 (!) 152/67 98.6  F (37  C) Oral 83 16 --   24 0406 (!) 142/81 -- -- 73 -- --   24 0036 135/85 98.4  F (36.9  C) Oral 79 16 --   24 2220 (!) 140/96 98.5  F (36.9  C) Oral 84 16 --   24 1600 (!) 142/92 97.9  F (36.6  C) Oral 79 18 --   24 1202 -- -- -- -- -- 87.1 kg (192 lb)   24 1136 (!) 145/93 -- -- 84 -- --   24 1104 (!) 153/86 -- -- -- -- --   24 0945 (!) 141/81 98.4  F (36.9  C) Oral 80 18 --     No intake or output data in the 24 hours ending 24 0858    All vitals stable  Uterus firm and nontender  Wound clean and dry with minimal or no drainage.  Surrounding skin with minimal erythema.  Musculoskeletal:  there is no redness, warmth, or swelling of the lower extremities          Data:   All laboratory data related to this surgery reviewed  Lab Results   Component Value Date    HGB 10.2 (L) 2024    HGB 8.8 (L) 2024    HGB 12.6 2024            Assessment and Plan:    Assessment:     Post-operative day #4  Low transverse primary  section  Elevated BP the past 24  hours.  Doing well.  Clean wound without signs of infection.  No immediate surgical complications identified.  Pain well-controlled.      Plan:  Ambulation encouraged  Reportable signs and symptoms dicussed with the patient  Follow up BP in the office in 2 days  Pt will get a BP cuff and monitor her BP at home.   Discharge later today      Kaleigh Alex MD  February 12, 2024  8:58 AM

## 2024-02-12 NOTE — PLAN OF CARE
Goal Outcome Evaluation:      Plan of Care Reviewed With: patient, spouse    Overall Patient Progress: improvingOverall Patient Progress: improving    Outcome Evaluation: Patient will discharge home 2/12/24 with po Nifedipine.    Data: Vital signs within normal limits. Postpartum checks within normal limits - see flow record. Patient eating and drinking normally. Patient able to empty bladder independently and is up ambulating up in room, and then has walked in the hallways . No apparent signs of infection. Incision healing well. Patient performing self cares and is able to care for infant.  Action: Patient medicated during the shift for pain and cramping. See MAR. Patient reassessed within 1 hour after each medication and pain was improved - patient stated she was comfortable. Patient education done about medication for Blood Pressure, breastfeeding, pumping. See flow record.  Response: Positive attachment behaviors observed with infant. Support person at bedside who has been attentive and helpful.   Plan: Anticipate discharge on 2/12/24

## 2024-02-12 NOTE — PLAN OF CARE
VSS on room air, Bps still slightly elevated overnight. Pt denies Pre-E symptoms. Fundus/lochia/incision WDL. Voiding appropriately and ambulating independently. Pain adequately controlled with scheduled medications. Breastfeeding infant independently q2-3hr. Pt is also pumping q2-3 hrs and bottle feeding maternally expressed milk. S/o at bedside, supportive. Positive bonding and attachment with infant observed.

## 2024-02-12 NOTE — PLAN OF CARE
VSS. Incision WDL. Up independently. Voiding without difficulty. Pain managed with tylenol and ibuprofen. Breastfeeding well and pumping. Positive bonding observed with infant. FOB supportive and involved in cares.    Discharge education done and questions answered. Pt received medications and will  BP monitor and Procardia at home pharmacy. Discharged home with family at 1330.

## 2024-02-12 NOTE — LACTATION NOTE
Lactation follow up with patient. Patient states baby less fussy at breast. Continuing to supplement with EBM. No questions or concerns at this time. Home today.

## 2024-02-12 NOTE — PROVIDER NOTIFICATION
02/12/24 1209   Provider Notification   Provider Name/Title Dr Alex   Method of Notification Phone   Request Evaluate-Remote   Notification Reason Other     Pt has been taking procardia the last two days but does not have any for discharge. Would you like this ordered?    Dr Alex will send Procardia prescription to pt home pharmacy.

## 2024-02-12 NOTE — PLAN OF CARE
Vital signs stable - blood pressures remain in mild range, pt denies HA/VC/RUQ pain. Fundus firm, midline, 2cm below the umbilicus. C/S incision covered with steri-strips, open to air/ CDI. Lochia rubra and scant. Voiding without difficulty. Ambulating independently. Pain managed with Tylenol and Ibuprofen. Breastfeeding and feeding EBM, tolerating well with no assistance. FOB at bedside and attentive to and supportive of patient. Bonding well with infant. Independent with self and infant cares. Encouraging pt to call with any questions or concerns. Will monitor as needed and continue with plan of care.

## 2024-02-15 ENCOUNTER — LAB REQUISITION (OUTPATIENT)
Dept: LAB | Facility: CLINIC | Age: 24
End: 2024-02-15
Payer: COMMERCIAL

## 2024-02-15 DIAGNOSIS — E03.9 HYPOTHYROIDISM, UNSPECIFIED: ICD-10-CM

## 2024-02-15 PROCEDURE — 84443 ASSAY THYROID STIM HORMONE: CPT | Mod: ORL | Performed by: OBSTETRICS & GYNECOLOGY

## 2024-02-15 NOTE — DISCHARGE SUMMARY
OB  Discharge Summary    DOA: 2024  DOD:  2024    Admission Diagnosis  1.  IUP @ 41 wks  2.  Hypothyroidism    Discharge Diagnosis  POD#4  s/p PLTCS for arrest of dilation  2. Pregnancy induced hypertension  3. Hypothyroidism  4. Acute blood loss anemia    HPI / Hospital Course:     23 year old  at 41w0d who presented for IOL for postdates.  She received PO cytotec for cervical ripening. AROM and IV pitocin were then used for augmentation.  During her labor course there were periods of category II FHR requiring IV pitocin to be discontinued. She continued to contract and reached 7-8 cm.  She received an epidural. She 4+ hours later she was still 7-8 cm and had another deceleration.  Once FHR recovered labor progress was discussed and last attempt to restart pitocin for augment was offered.  After 1 hour however and now 6 hours at 7-8 cm, arrest of dilation was diagnosed and a  section was recommended. Risks and benefits were reviewed and the patient consented for the procedure.     Intra-operative course:  Patient was taken to the operating room for the above noted procedure. No complications.  For full details of intraoperative course, please see dictated operative note.      Post-operative course:   Patient's post-operative course was complicated by acute blood loss anemia and elevated blood pressures. She was started on PO iron for Hgb 8.8 following delivery. On POD#3 she was noted to be having elevated blood pressures, preeclampsia labs were normal. She was started on PO procardia for blood pressures.  By post-operative day #4 she was ambulating, her pain was well-controlled on oral pain medications, her bleeding was minimal, she was tolerating PO, she was voiding and passing flatus.  Patient was deemed stable for discharge.  Her post-op Hgb was   Lab Results   Component Value Date    HGB 10.2 2024    HGB 8.8 2024    HGB 13.1 2019   .  Her vital signs remained  stable  On the day of discharge, her wound was without evidence of infection.      Post-op instructions:  1.  Patient was instructed to RTC in 2-3 days for blood pressure check and in 6 weeks for post-partum visit.  2.  Patient was instructed to call MD for temperature greater than 100.4, foul smelling vaginal discharge, bleeding >1pad per hour, severe pain not controlled by pain medications, severe HA, redness/drainage from incision, asymmetric LE edema or with other concerns.  3.  Post-partum mood symptoms discussed.  Pt will call with si/sx of depression.  4.  Patient instructed to avoid lifting >20# x 6 weeks, to avoid vigorous exercise x 6 weeks, to observe pelvic rest x 6 weeks (no tampons or IC) and to avoid driving while taking narcotics.  5.  Patient instructed to remove steri-strips after 1 week.       Post-op medications:  1.  Ibuprofen 600mg PO Q6hrs PRN pain  2.  Oxycodone 5mg PO Q4hrs PRN severe pain  3.  Colace or other stool softener as needed  4.  PNV  5.  Resume home medications  6. Procardia XL 30 mg daily    Christa Pringle MD  2/15/2024  3:48 PM

## 2024-02-16 LAB — TSH SERPL DL<=0.005 MIU/L-ACNC: 2.65 UIU/ML (ref 0.3–4.2)

## 2024-07-30 DIAGNOSIS — E03.9 HYPOTHYROIDISM: Primary | ICD-10-CM

## 2024-08-09 ENCOUNTER — LAB (OUTPATIENT)
Dept: LAB | Facility: CLINIC | Age: 24
End: 2024-08-09
Payer: COMMERCIAL

## 2024-08-09 DIAGNOSIS — E03.9 HYPOTHYROIDISM: ICD-10-CM

## 2024-08-09 LAB
T4 FREE SERPL-MCNC: 1.3 NG/DL (ref 0.9–1.7)
TSH SERPL DL<=0.005 MIU/L-ACNC: 5.88 UIU/ML (ref 0.3–4.2)

## 2024-08-09 PROCEDURE — 36415 COLL VENOUS BLD VENIPUNCTURE: CPT

## 2024-08-09 PROCEDURE — 84443 ASSAY THYROID STIM HORMONE: CPT

## 2024-08-09 PROCEDURE — 84439 ASSAY OF FREE THYROXINE: CPT

## 2024-08-20 ENCOUNTER — TRANSCRIBE ORDERS (OUTPATIENT)
Dept: OTHER | Age: 24
End: 2024-08-20

## 2024-08-20 DIAGNOSIS — M25.519 SHOULDER PAIN: Primary | ICD-10-CM

## 2024-08-23 ENCOUNTER — THERAPY VISIT (OUTPATIENT)
Dept: PHYSICAL THERAPY | Facility: CLINIC | Age: 24
End: 2024-08-23
Attending: OBSTETRICS & GYNECOLOGY
Payer: COMMERCIAL

## 2024-08-23 DIAGNOSIS — M54.2 NECK PAIN: Primary | ICD-10-CM

## 2024-08-23 DIAGNOSIS — M25.519 SHOULDER PAIN: ICD-10-CM

## 2024-08-23 DIAGNOSIS — M54.12 CERVICAL RADICULOPATHY: ICD-10-CM

## 2024-08-23 PROCEDURE — 97161 PT EVAL LOW COMPLEX 20 MIN: CPT | Mod: GP | Performed by: PHYSICAL THERAPIST

## 2024-08-23 PROCEDURE — 97140 MANUAL THERAPY 1/> REGIONS: CPT | Mod: GP | Performed by: PHYSICAL THERAPIST

## 2024-08-23 PROCEDURE — 97035 APP MDLTY 1+ULTRASOUND EA 15: CPT | Mod: GP | Performed by: PHYSICAL THERAPIST

## 2024-08-23 PROCEDURE — 97110 THERAPEUTIC EXERCISES: CPT | Mod: GP | Performed by: PHYSICAL THERAPIST

## 2024-08-23 ASSESSMENT — ACTIVITIES OF DAILY LIVING (ADL)
PUSHING_WITH_THE_INVOLVED_ARM: 7
AT_ITS_WORST?: 8
PLEASE_INDICATE_YOR_PRIMARY_REASON_FOR_REFERRAL_TO_THERAPY:: SHOULDER
TOUCHING_THE_BACK_OF_YOUR_NECK: 2
REACHING_FOR_SOMETHING_ON_A_HIGH_SHELF: 2
WHEN_LYING_ON_THE_INVOLVED_SIDE: 5

## 2024-08-23 NOTE — PROGRESS NOTES
PHYSICAL THERAPY EVALUATION  Type of Visit: Evaluation              Subjective     Pt describes intermittent pain in the right neck and UT region.  Felt when looking down.  Also has intermittent pain that radiates into there right UE down to her hand with intermittent n/t in the 4th and 5th fingers.  Began insidiously about 6 months ago shortly after the birth of her son.  Getting worse.  Referred to PT on 24.        Presenting condition or subjective complaint: shooting shoulder pain that occurs when looking down and is usually constant.  Date of onset: 24 (MD order)    Relevant medical history: Thyroid problems   Dates & types of surgery:  on 2024     wisdom teeth on 2019    Prior diagnostic imaging/testing results: Other none   Prior therapy history for the same diagnosis, illness or injury: No      Prior Level of Function  Transfers: Independent  Ambulation: Independent  ADL: Independent  IADL:     Living Environment  Social support: With a significant other or spouse   Type of home: Apartment/condo   Stairs to enter the home: No       Ramp: Yes   Stairs inside the home: No       Help at home: None  Equipment owned:       Employment: No    Hobbies/Interests:      Patient goals for therapy: I would like to be able to do yoga or regular workouts without shooting pain.    Pain assessment: See objective evaluation for additional pain details     Objective   CERVICAL SPINE EVALUATION  PAIN: Pain Level at Rest: 5/10  Pain Level with Use: 8/10  Pain Location: right N/UT  Pain is Exacerbated By: looking down  INTEGUMENTARY (edema, incisions): WNL  POSTURE: WNL  GAIT:   Weightbearing Status:   Assistive Device(s):   Gait Deviations:   BALANCE/PROPRIOCEPTION:   WEIGHTBEARING ALIGNMENT: WNL  ROM:   (Degrees) Left AROM Right AROM    Cervical Flexion Mod limitation with pain    Cervical Extension Slight loss    Cervical Side bend Pulling in right neck WNL    Cervical Rotation Pulling in right  neck WNL    Cervical Protrusion Full with pain    Cervical Retraction Slight loss    Thoracic Flexion     Thoracic Extension     Thoracic Rotation       Left AROM Left PROM Right AROM Right PROM   Shoulder Flexion WNL  WNL    Shoulder Extension       Shoulder Abduction WNL  WNL    Shoulder Adduction       Shoulder IR       Shoulder ER       Shoulder Horiz Abduction       Shoulder Horiz Adduction       Pain:   End Feel:     MYOTOMES: WNL  DTR S: WNL  CORD SIGNS:   DERMATOMES: WNL  NEURAL TENSION:   FLEXIBILITY:    SPECIAL TESTS:   PALPATION:  mild hypertonicity of right UT/N  SPINAL SEGMENTAL CONCLUSIONS:       Assessment & Plan   CLINICAL IMPRESSIONS  Medical Diagnosis: Shoulder pain    Treatment Diagnosis: Neck pain with right UE radiculopathy   Impression/Assessment: Patient is a 23 year old female with right neck and UE complaints.  The following significant findings have been identified: Pain and Decreased ROM/flexibility. These impairments interfere with their ability to perform self care tasks, recreational activities, household chores, and driving  as compared to previous level of function.     Clinical Decision Making (Complexity):  Clinical Presentation: Stable/Uncomplicated  Clinical Presentation Rationale: based on medical and personal factors listed in PT evaluation  Clinical Decision Making (Complexity): Low complexity    PLAN OF CARE  Treatment Interventions:  Modalities: Ultrasound  Interventions: Manual Therapy, Therapeutic Exercise    Long Term Goals     PT Goal 1  Goal Identifier: Neck movement  Goal Description: Able to look down and in all directions without sxs or ROM limitations  Rationale: to maximize safety and independence with performance of ADLs and functional tasks  Target Date: 09/27/24      Frequency of Treatment: 1x/week  Duration of Treatment: 6 weeks    Recommended Referrals to Other Professionals:   Education Assessment:        Risks and benefits of evaluation/treatment have been  explained.   Patient/Family/caregiver agrees with Plan of Care.     Evaluation Time:     PT Eval, Low Complexity Minutes (20736): 20       Signing Clinician: SAMUEL Boo [facility]                                                                                   OUTPATIENT PHYSICAL THERAPY      PLAN OF TREATMENT FOR OUTPATIENT REHABILITATION   Patient's Last Name, First Name, Halie Mac YOB: 2000   Provider's Name   [facility]   Medical Record No.  [id number]     Onset Date: 08/20/24 (MD order)  Start of Care Date: 08/23/24     Medical Diagnosis:  Shoulder pain      PT Treatment Diagnosis:  Neck pain with right UE radiculopathy Plan of Treatment  Frequency/Duration: 1x/week/ 6 weeks    Certification date from 08/23/24 to 10/03/24         See note for plan of treatment details and functional goals     Chirag Childs PT                         I CERTIFY THE NEED FOR THESE SERVICES FURNISHED UNDER        THIS PLAN OF TREATMENT AND WHILE UNDER MY CARE     (Physician attestation of this document indicates review and certification of the therapy plan).              Referring Provider:  Christa Pringle    Initial Assessment  See Epic Evaluation- Start of Care Date: 08/23/24

## 2024-09-11 ENCOUNTER — THERAPY VISIT (OUTPATIENT)
Dept: PHYSICAL THERAPY | Facility: CLINIC | Age: 24
End: 2024-09-11
Payer: COMMERCIAL

## 2024-09-11 DIAGNOSIS — M54.2 NECK PAIN: Primary | ICD-10-CM

## 2024-09-11 DIAGNOSIS — M54.12 CERVICAL RADICULOPATHY: ICD-10-CM

## 2024-09-11 PROCEDURE — 97110 THERAPEUTIC EXERCISES: CPT | Mod: GP | Performed by: PHYSICAL THERAPIST

## 2024-09-11 PROCEDURE — 97035 APP MDLTY 1+ULTRASOUND EA 15: CPT | Mod: GP | Performed by: PHYSICAL THERAPIST

## 2024-09-11 PROCEDURE — 97140 MANUAL THERAPY 1/> REGIONS: CPT | Mod: GP | Performed by: PHYSICAL THERAPIST

## 2024-09-18 ENCOUNTER — THERAPY VISIT (OUTPATIENT)
Dept: PHYSICAL THERAPY | Facility: CLINIC | Age: 24
End: 2024-09-18
Payer: COMMERCIAL

## 2024-09-18 DIAGNOSIS — M54.12 CERVICAL RADICULOPATHY: ICD-10-CM

## 2024-09-18 DIAGNOSIS — M54.2 NECK PAIN: Primary | ICD-10-CM

## 2024-09-18 PROCEDURE — 97140 MANUAL THERAPY 1/> REGIONS: CPT | Mod: GP | Performed by: PHYSICAL THERAPIST

## 2024-09-18 PROCEDURE — 97035 APP MDLTY 1+ULTRASOUND EA 15: CPT | Mod: GP | Performed by: PHYSICAL THERAPIST

## 2024-09-18 NOTE — PROGRESS NOTES
09/18/24 0500   Appointment Info   Signing clinician's name / credentials Chirag Childs PT   Total/Authorized Visits 6 (ET)   Visits Used 3   Medical Diagnosis Shoulder pain   PT Tx Diagnosis Neck pain with right UE radiculopathy   Progress Note/Certification   Start of Care Date 08/23/24   Onset of illness/injury or Date of Surgery 08/20/24  (MD order)   Therapy Frequency 1x/week   Predicted Duration 6 weeks   Certification date from 08/23/24   Certification date to 10/03/24   Progress Note Completed Date 09/18/24   PT Goal 1   Goal Identifier Neck movement   Goal Description Able to look down and in all directions without sxs or ROM limitations   Rationale to maximize safety and independence with performance of ADLs and functional tasks   Goal Progress met   Target Date 09/27/24   Date Met 09/18/24   Subjective Report   Subjective Report Pt reports no right UE sxs and nearly full resolution of right neck pain.  She can now look in all directions without pain.   Objective Measures   Objective Measures Objective Measure 1   Objective Measure 1   Objective Measure Full CROM in all directions with no pain.   PT Modalities   PT Modalities Ultrasound   Ultrasound   Ultrasound Minutes (72419) 8   Ultrasound -Type (does not include 3-5 min prep/cleanup time) Continuous   Intensity 1.7   Duration (does not include the 3-5 min set up/clean up time) 5 min   Frequency 1 MHz   Location Right N/UT   Positioning prone   Treatment Interventions (PT)   Interventions Therapeutic Procedure/Exercise;Manual Therapy   Therapeutic Procedure/Exercise   Therapeutic Procedures Ther Proc 2;Ther Proc 3;Ther Proc 4;Ther Proc 5;Ther Proc 6;Ther Proc 7;Ther Proc 8   Ther Proc 1 Cerv retr with OP in sitting   Ther Proc 1 - Details review   Ther Proc 2 Cerv retr/ext in sitting   Ther Proc 2 - Details review   Ther Proc 3 Educ on posture, dx   Ther Proc 3 - Details review   Ther Proc 4 Prone arm raise #1   Ther Proc 4 - Details review   Ther  Proc 5 Prone cerv retr with ext   Ther Proc 5 - Details review   Ther Proc 6 Pulldowns   Ther Proc 6 - Details review   Skilled Intervention Verbal, visual cueing   Manual Therapy   Manual Therapy: Mobilization, MFR, MLD, friction massage minutes (05508) 10   Manual Therapy 1 STM to bilat N/UT   Manual Therapy 1 - Details 6 min prone   Plan   Plan for next session DC with HEP.  f/u prn         DISCHARGE  Reason for Discharge: Patient has met all goals.    Equipment Issued: none    Discharge Plan: Patient to continue home program.    Referring Provider:  Christa Pringle

## 2024-11-07 ENCOUNTER — LAB (OUTPATIENT)
Dept: LAB | Facility: CLINIC | Age: 24
End: 2024-11-07
Payer: COMMERCIAL

## 2024-11-07 DIAGNOSIS — E03.9 HYPOTHYROIDISM: Primary | ICD-10-CM

## 2024-11-07 LAB
HOLD SPECIMEN: NORMAL
HOLD SPECIMEN: NORMAL
T4 FREE SERPL-MCNC: 1.69 NG/DL (ref 0.9–1.7)
TSH SERPL DL<=0.005 MIU/L-ACNC: 0.49 UIU/ML (ref 0.3–4.2)

## 2024-11-07 PROCEDURE — 36415 COLL VENOUS BLD VENIPUNCTURE: CPT

## 2024-11-07 PROCEDURE — 84443 ASSAY THYROID STIM HORMONE: CPT

## 2024-11-07 PROCEDURE — 84439 ASSAY OF FREE THYROXINE: CPT

## 2024-12-29 ENCOUNTER — HEALTH MAINTENANCE LETTER (OUTPATIENT)
Age: 24
End: 2024-12-29

## 2025-08-21 DIAGNOSIS — Z34.90 CURRENTLY PREGNANT: ICD-10-CM

## 2025-08-21 DIAGNOSIS — E03.9 HYPOTHYROIDISM: Primary | ICD-10-CM

## 2025-08-25 ENCOUNTER — LAB (OUTPATIENT)
Dept: LAB | Facility: CLINIC | Age: 25
End: 2025-08-25
Payer: COMMERCIAL

## 2025-08-25 DIAGNOSIS — Z34.90 CURRENTLY PREGNANT: ICD-10-CM

## 2025-08-25 DIAGNOSIS — E03.9 HYPOTHYROIDISM: ICD-10-CM

## 2025-08-25 LAB
T4 SERPL-MCNC: 10.4 UG/DL (ref 4.5–11.7)
TSH SERPL DL<=0.005 MIU/L-ACNC: 1.1 UIU/ML (ref 0.3–4.2)

## 2025-08-25 PROCEDURE — 36415 COLL VENOUS BLD VENIPUNCTURE: CPT

## 2025-08-25 PROCEDURE — 84436 ASSAY OF TOTAL THYROXINE: CPT

## 2025-08-25 PROCEDURE — 84443 ASSAY THYROID STIM HORMONE: CPT

## (undated) DEVICE — GLOVE BIOGEL PI MICRO SZ 6.5 48565

## (undated) DEVICE — LINEN HALF SHEET 5512

## (undated) DEVICE — SU VICRYL 0 CT-1 36" J346H

## (undated) DEVICE — SU VICRYL 0 CT 36" J358H

## (undated) DEVICE — STOCKING SLEEVE VASOPRESS COMPRESSION CALF MED 18" VP501M

## (undated) DEVICE — PACK C-SECTION LF PL15OTA83B

## (undated) DEVICE — GLOVE BIOGEL PI MICRO SZ 6.0 48560

## (undated) DEVICE — LINEN FULL SHEET 5511

## (undated) DEVICE — PREP CHLORAPREP 26ML TINTED HI-LITE ORANGE 930815

## (undated) DEVICE — Device

## (undated) DEVICE — DRSG TEGADERM 4X10" 1627

## (undated) DEVICE — SU MONOCRYL 4-0 PS-2 27" UND Y426H

## (undated) DEVICE — ESU GROUND PAD ADULT W/CORD E7507

## (undated) DEVICE — PAD CHUX UNDERPAD 30X36" P3036C

## (undated) DEVICE — BAG CLEAR TRASH 1.3M 39X33" P4040C

## (undated) DEVICE — CAP BABY PINK/BLUE IC-2

## (undated) DEVICE — SU VICRYL 4-0 PS-2 18" UND J496H

## (undated) DEVICE — SU MONOCRYL 0 CT 36" Y358H

## (undated) DEVICE — SOL WATER IRRIG 1000ML BOTTLE 2F7114

## (undated) DEVICE — DRSG STERI STRIP 1/2X4" R1547

## (undated) DEVICE — CATH TRAY FOLEY SURESTEP 16FR DRAIN BAG STATOCK A899916

## (undated) DEVICE — DRSG TELFA 3X8" 1238

## (undated) DEVICE — TRANSFER DEVICE BLOOD NDL HOLDER 364880

## (undated) DEVICE — BLADE CLIPPER SGL USE 9680

## (undated) DEVICE — LINEN TOWEL PACK X10 5473

## (undated) DEVICE — LINEN DRAPE 54X72" 5467

## (undated) DEVICE — SU VICRYL 2-0 CT-1 27" UND J259H

## (undated) DEVICE — DRSG ABDOMINAL 07 1/2X8" 7197D

## (undated) DEVICE — SOL NACL 0.9% IRRIG 1000ML BOTTLE 2F7124

## (undated) RX ORDER — FENTANYL CITRATE-0.9 % NACL/PF 10 MCG/ML
PLASTIC BAG, INJECTION (ML) INTRAVENOUS
Status: DISPENSED
Start: 2024-02-08

## (undated) RX ORDER — MORPHINE SULFATE 1 MG/ML
INJECTION, SOLUTION EPIDURAL; INTRATHECAL; INTRAVENOUS
Status: DISPENSED
Start: 2024-02-08

## (undated) RX ORDER — OXYTOCIN/0.9 % SODIUM CHLORIDE 30/500 ML
PLASTIC BAG, INJECTION (ML) INTRAVENOUS
Status: DISPENSED
Start: 2024-02-08

## (undated) RX ORDER — FENTANYL CITRATE 50 UG/ML
INJECTION, SOLUTION INTRAMUSCULAR; INTRAVENOUS
Status: DISPENSED
Start: 2024-02-08

## (undated) RX ORDER — ONDANSETRON 2 MG/ML
INJECTION INTRAMUSCULAR; INTRAVENOUS
Status: DISPENSED
Start: 2024-02-08